# Patient Record
Sex: MALE | NOT HISPANIC OR LATINO | Employment: OTHER | URBAN - METROPOLITAN AREA
[De-identification: names, ages, dates, MRNs, and addresses within clinical notes are randomized per-mention and may not be internally consistent; named-entity substitution may affect disease eponyms.]

---

## 2022-12-17 PROBLEM — Z85.828 HISTORY OF BASAL CELL CARCINOMA: Status: ACTIVE | Noted: 2022-12-17

## 2022-12-17 PROBLEM — Z86.018 HISTORY OF DYSPLASTIC NEVUS: Status: ACTIVE | Noted: 2022-12-17

## 2022-12-17 PROBLEM — Z12.11 SCREENING FOR COLON CANCER: Status: ACTIVE | Noted: 2022-12-17

## 2022-12-17 PROBLEM — E78.2 MIXED HYPERLIPIDEMIA: Status: ACTIVE | Noted: 2022-12-17

## 2022-12-17 PROBLEM — I10 ESSENTIAL HYPERTENSION: Status: ACTIVE | Noted: 2022-12-17

## 2022-12-17 PROBLEM — H91.90 HARD OF HEARING: Status: ACTIVE | Noted: 2022-12-17

## 2022-12-17 PROBLEM — J30.2 SEASONAL ALLERGIC RHINITIS: Status: ACTIVE | Noted: 2022-12-17

## 2022-12-17 PROBLEM — M19.90 OSTEOARTHRITIS: Status: ACTIVE | Noted: 2022-12-17

## 2022-12-17 PROBLEM — D12.6 ADENOMATOUS POLYP OF COLON: Status: ACTIVE | Noted: 2022-12-17

## 2022-12-17 PROBLEM — I25.10 CORONARY ATHEROSCLEROSIS OF NATIVE CORONARY ARTERY: Status: ACTIVE | Noted: 2022-12-17

## 2022-12-17 PROBLEM — K62.5 RECTAL BLEEDING: Status: ACTIVE | Noted: 2022-12-17

## 2022-12-17 PROBLEM — H26.9 CATARACT: Status: ACTIVE | Noted: 2022-12-17

## 2022-12-17 PROBLEM — G43.009 MIGRAINE WITHOUT AURA AND WITHOUT STATUS MIGRAINOSUS, NOT INTRACTABLE: Status: ACTIVE | Noted: 2022-12-17

## 2022-12-17 PROBLEM — C61 MALIGNANT NEOPLASM OF PROSTATE (H): Status: ACTIVE | Noted: 2022-12-17

## 2022-12-17 PROBLEM — N20.0 CALCULUS OF KIDNEY: Status: ACTIVE | Noted: 2022-12-17

## 2022-12-19 ENCOUNTER — ANESTHESIA EVENT (OUTPATIENT)
Dept: SURGERY | Facility: CLINIC | Age: 82
DRG: 468 | End: 2022-12-19
Payer: COMMERCIAL

## 2022-12-19 RX ORDER — FEXOFENADINE HCL 180 MG/1
180 TABLET ORAL EVERY MORNING
COMMUNITY

## 2022-12-19 RX ORDER — LOSARTAN POTASSIUM 100 MG/1
100 TABLET ORAL EVERY EVENING
COMMUNITY

## 2022-12-19 ASSESSMENT — LIFESTYLE VARIABLES: TOBACCO_USE: 0

## 2022-12-19 NOTE — PROGRESS NOTES
PTA medications updated by Medication Scribe prior to surgery via phone call with patient (last doses completed by Nurse)     Medication history sources: Patient and H&P  In the past week, patient estimated taking medication this percent of the time: Greater than 90%  Adherence assessment: N/A Not Observed    Significant changes made to the medication list:  None      Additional medication history information:   None    Medication reconciliation completed by provider prior to medication history? No    Time spent in this activity: 35 minutes    The information provided in this note is only as accurate as the sources available at the time of update(s)      Prior to Admission medications    Medication Sig Last Dose Taking? Auth Provider Long Term End Date   ASPIRIN PO Take 81 mg by mouth daily 12/10/2022 at PM Yes Reported, Patient     Atorvastatin Calcium (LIPITOR PO) Take 20 mg by mouth every evening 12/19/2022 at PM Yes Reported, Patient Yes    calcium carbonate (OS- MG [place]. [place]) 500 MG tablet Take 500 mg by mouth daily 12/19/2022 at AM Yes Reported, Patient     diphenhydrAMINE-acetaminophen (TYLENOL PM)  MG tablet Take 1 tablet by mouth nightly as needed for sleep  at HS Yes Reported, Patient     fexofenadine (ALLEGRA) 180 MG tablet Take 180 mg by mouth every morning 12/19/2022 at AM Yes Reported, Patient     losartan (COZAAR) 100 MG tablet Take 100 mg by mouth daily 12/19/2022 at PM Yes Reported, Patient Yes    Omega-3 Fatty Acids (OMEGA-3 FISH OIL) 1200 MG CAPS Take 3 capsules by mouth daily 12/19/2022 at AM Yes Reported, Patient

## 2022-12-20 ENCOUNTER — APPOINTMENT (OUTPATIENT)
Dept: GENERAL RADIOLOGY | Facility: CLINIC | Age: 82
DRG: 468 | End: 2022-12-20
Attending: PHYSICIAN ASSISTANT
Payer: COMMERCIAL

## 2022-12-20 ENCOUNTER — ANESTHESIA (OUTPATIENT)
Dept: SURGERY | Facility: CLINIC | Age: 82
DRG: 468 | End: 2022-12-20
Payer: COMMERCIAL

## 2022-12-20 ENCOUNTER — HOSPITAL ENCOUNTER (INPATIENT)
Facility: CLINIC | Age: 82
LOS: 1 days | Discharge: HOME | DRG: 468 | End: 2022-12-21
Attending: ORTHOPAEDIC SURGERY | Admitting: ORTHOPAEDIC SURGERY
Payer: COMMERCIAL

## 2022-12-20 DIAGNOSIS — Z98.890 POST-OPERATIVE STATE: Primary | ICD-10-CM

## 2022-12-20 PROBLEM — Z96.649 STATUS POST REVISION OF TOTAL HIP REPLACEMENT: Status: ACTIVE | Noted: 2022-12-20

## 2022-12-20 LAB
ABO/RH(D): NORMAL
ANTIBODY SCREEN: NEGATIVE
APPEARANCE FLD: ABNORMAL
CELL COUNT BODY FLUID SOURCE: ABNORMAL
COLOR FLD: ABNORMAL
CREAT SERPL-MCNC: 1.1 MG/DL (ref 0.66–1.25)
GFR SERPL CREATININE-BSD FRML MDRD: 68 ML/MIN/1.73M2
GLUCOSE BLD-MCNC: 108 MG/DL (ref 70–99)
PATH REV: ABNORMAL
POTASSIUM BLD-SCNC: 3.7 MMOL/L (ref 3.4–5.3)
SPECIMEN EXPIRATION DATE: NORMAL

## 2022-12-20 PROCEDURE — 0SUE09Z SUPPLEMENT LEFT HIP JOINT, ACETABULAR SURFACE WITH LINER, OPEN APPROACH: ICD-10-PCS | Performed by: ORTHOPAEDIC SURGERY

## 2022-12-20 PROCEDURE — 0SPS0JZ REMOVAL OF SYNTHETIC SUBSTITUTE FROM LEFT HIP JOINT, FEMORAL SURFACE, OPEN APPROACH: ICD-10-PCS | Performed by: ORTHOPAEDIC SURGERY

## 2022-12-20 PROCEDURE — 250N000011 HC RX IP 250 OP 636

## 2022-12-20 PROCEDURE — C1776 JOINT DEVICE (IMPLANTABLE): HCPCS | Performed by: ORTHOPAEDIC SURGERY

## 2022-12-20 PROCEDURE — 250N000013 HC RX MED GY IP 250 OP 250 PS 637: Performed by: PHYSICIAN ASSISTANT

## 2022-12-20 PROCEDURE — 258N000003 HC RX IP 258 OP 636: Performed by: ANESTHESIOLOGY

## 2022-12-20 PROCEDURE — 250N000011 HC RX IP 250 OP 636: Performed by: ORTHOPAEDIC SURGERY

## 2022-12-20 PROCEDURE — 999N000065 XR PELVIS AND HIP PORTABLE LEFT 1 VIEW

## 2022-12-20 PROCEDURE — 87070 CULTURE OTHR SPECIMN AEROBIC: CPT | Performed by: ORTHOPAEDIC SURGERY

## 2022-12-20 PROCEDURE — 87075 CULTR BACTERIA EXCEPT BLOOD: CPT | Performed by: ORTHOPAEDIC SURGERY

## 2022-12-20 PROCEDURE — 250N000011 HC RX IP 250 OP 636: Performed by: ANESTHESIOLOGY

## 2022-12-20 PROCEDURE — 86850 RBC ANTIBODY SCREEN: CPT | Performed by: ANESTHESIOLOGY

## 2022-12-20 PROCEDURE — C1713 ANCHOR/SCREW BN/BN,TIS/BN: HCPCS | Performed by: ORTHOPAEDIC SURGERY

## 2022-12-20 PROCEDURE — 86901 BLOOD TYPING SEROLOGIC RH(D): CPT | Performed by: ANESTHESIOLOGY

## 2022-12-20 PROCEDURE — 36415 COLL VENOUS BLD VENIPUNCTURE: CPT | Performed by: ANESTHESIOLOGY

## 2022-12-20 PROCEDURE — 120N000001 HC R&B MED SURG/OB

## 2022-12-20 PROCEDURE — 250N000009 HC RX 250

## 2022-12-20 PROCEDURE — 87206 SMEAR FLUORESCENT/ACID STAI: CPT | Performed by: ORTHOPAEDIC SURGERY

## 2022-12-20 PROCEDURE — 250N000011 HC RX IP 250 OP 636: Performed by: PHYSICIAN ASSISTANT

## 2022-12-20 PROCEDURE — 87116 MYCOBACTERIA CULTURE: CPT | Performed by: ORTHOPAEDIC SURGERY

## 2022-12-20 PROCEDURE — 0SRS03A REPLACEMENT OF LEFT HIP JOINT, FEMORAL SURFACE WITH CERAMIC SYNTHETIC SUBSTITUTE, UNCEMENTED, OPEN APPROACH: ICD-10-PCS | Performed by: ORTHOPAEDIC SURGERY

## 2022-12-20 PROCEDURE — 258N000001 HC RX 258: Performed by: ORTHOPAEDIC SURGERY

## 2022-12-20 PROCEDURE — 710N000009 HC RECOVERY PHASE 1, LEVEL 1, PER MIN: Performed by: ORTHOPAEDIC SURGERY

## 2022-12-20 PROCEDURE — 82565 ASSAY OF CREATININE: CPT | Performed by: ORTHOPAEDIC SURGERY

## 2022-12-20 PROCEDURE — 272N000001 HC OR GENERAL SUPPLY STERILE: Performed by: ORTHOPAEDIC SURGERY

## 2022-12-20 PROCEDURE — 360N000078 HC SURGERY LEVEL 5, PER MIN: Performed by: ORTHOPAEDIC SURGERY

## 2022-12-20 PROCEDURE — 84132 ASSAY OF SERUM POTASSIUM: CPT | Performed by: ANESTHESIOLOGY

## 2022-12-20 PROCEDURE — 370N000017 HC ANESTHESIA TECHNICAL FEE, PER MIN: Performed by: ORTHOPAEDIC SURGERY

## 2022-12-20 PROCEDURE — 82947 ASSAY GLUCOSE BLOOD QUANT: CPT | Performed by: ANESTHESIOLOGY

## 2022-12-20 PROCEDURE — 89051 BODY FLUID CELL COUNT: CPT | Performed by: ORTHOPAEDIC SURGERY

## 2022-12-20 PROCEDURE — 250N000009 HC RX 250: Performed by: ORTHOPAEDIC SURGERY

## 2022-12-20 PROCEDURE — 258N000003 HC RX IP 258 OP 636: Performed by: PHYSICIAN ASSISTANT

## 2022-12-20 PROCEDURE — 0S9B0ZX DRAINAGE OF LEFT HIP JOINT, OPEN APPROACH, DIAGNOSTIC: ICD-10-PCS | Performed by: ORTHOPAEDIC SURGERY

## 2022-12-20 PROCEDURE — 999N000141 HC STATISTIC PRE-PROCEDURE NURSING ASSESSMENT: Performed by: ORTHOPAEDIC SURGERY

## 2022-12-20 PROCEDURE — 99207 PR NO CHARGE LOS: CPT | Performed by: PHYSICIAN ASSISTANT

## 2022-12-20 PROCEDURE — 0SPB09Z REMOVAL OF LINER FROM LEFT HIP JOINT, OPEN APPROACH: ICD-10-PCS | Performed by: ORTHOPAEDIC SURGERY

## 2022-12-20 DEVICE — R3 20 DEGREE XLPE ACETABULAR LINER                                    40MM INNER DIAMETER X 56MM OUTER DIAMETER
Type: IMPLANTABLE DEVICE | Site: HIP | Status: FUNCTIONAL
Brand: R3

## 2022-12-20 DEVICE — BIOLOX DELTA HEAD 40MM 12/14 SHORT+0
Type: IMPLANTABLE DEVICE | Site: HIP | Status: FUNCTIONAL
Brand: BIOLOX DELTA

## 2022-12-20 RX ORDER — CEFAZOLIN SODIUM/WATER 2 G/20 ML
2 SYRINGE (ML) INTRAVENOUS SEE ADMIN INSTRUCTIONS
Status: DISCONTINUED | OUTPATIENT
Start: 2022-12-20 | End: 2022-12-20 | Stop reason: HOSPADM

## 2022-12-20 RX ORDER — DIPHENHYDRAMINE HCL 12.5MG/5ML
12.5 LIQUID (ML) ORAL EVERY 6 HOURS PRN
Status: DISCONTINUED | OUTPATIENT
Start: 2022-12-20 | End: 2022-12-21 | Stop reason: HOSPADM

## 2022-12-20 RX ORDER — ONDANSETRON 2 MG/ML
INJECTION INTRAMUSCULAR; INTRAVENOUS PRN
Status: DISCONTINUED | OUTPATIENT
Start: 2022-12-20 | End: 2022-12-20

## 2022-12-20 RX ORDER — ONDANSETRON 4 MG/1
4 TABLET, ORALLY DISINTEGRATING ORAL EVERY 30 MIN PRN
Status: DISCONTINUED | OUTPATIENT
Start: 2022-12-20 | End: 2022-12-20 | Stop reason: HOSPADM

## 2022-12-20 RX ORDER — OXYCODONE HYDROCHLORIDE 5 MG/1
10 TABLET ORAL EVERY 4 HOURS PRN
Status: DISCONTINUED | OUTPATIENT
Start: 2022-12-20 | End: 2022-12-21 | Stop reason: HOSPADM

## 2022-12-20 RX ORDER — TRANEXAMIC ACID 650 MG/1
1950 TABLET ORAL ONCE
Status: COMPLETED | OUTPATIENT
Start: 2022-12-20 | End: 2022-12-20

## 2022-12-20 RX ORDER — SODIUM CHLORIDE, SODIUM LACTATE, POTASSIUM CHLORIDE, CALCIUM CHLORIDE 600; 310; 30; 20 MG/100ML; MG/100ML; MG/100ML; MG/100ML
INJECTION, SOLUTION INTRAVENOUS CONTINUOUS
Status: DISCONTINUED | OUTPATIENT
Start: 2022-12-20 | End: 2022-12-20 | Stop reason: HOSPADM

## 2022-12-20 RX ORDER — ACETAMINOPHEN 325 MG/1
650 TABLET ORAL EVERY 4 HOURS PRN
Qty: 100 TABLET | Refills: 0 | Status: SHIPPED | OUTPATIENT
Start: 2022-12-20 | End: 2022-12-21

## 2022-12-20 RX ORDER — POLYETHYLENE GLYCOL 3350 17 G/17G
17 POWDER, FOR SOLUTION ORAL DAILY
Status: DISCONTINUED | OUTPATIENT
Start: 2022-12-21 | End: 2022-12-21 | Stop reason: HOSPADM

## 2022-12-20 RX ORDER — LIDOCAINE 40 MG/G
CREAM TOPICAL
Status: DISCONTINUED | OUTPATIENT
Start: 2022-12-20 | End: 2022-12-21 | Stop reason: HOSPADM

## 2022-12-20 RX ORDER — PROPOFOL 10 MG/ML
INJECTION, EMULSION INTRAVENOUS PRN
Status: DISCONTINUED | OUTPATIENT
Start: 2022-12-20 | End: 2022-12-20

## 2022-12-20 RX ORDER — NALOXONE HYDROCHLORIDE 0.4 MG/ML
0.4 INJECTION, SOLUTION INTRAMUSCULAR; INTRAVENOUS; SUBCUTANEOUS
Status: DISCONTINUED | OUTPATIENT
Start: 2022-12-20 | End: 2022-12-21 | Stop reason: HOSPADM

## 2022-12-20 RX ORDER — HYDROMORPHONE HCL IN WATER/PF 6 MG/30 ML
0.2 PATIENT CONTROLLED ANALGESIA SYRINGE INTRAVENOUS
Status: DISCONTINUED | OUTPATIENT
Start: 2022-12-20 | End: 2022-12-21 | Stop reason: HOSPADM

## 2022-12-20 RX ORDER — HYDROMORPHONE HCL IN WATER/PF 6 MG/30 ML
0.4 PATIENT CONTROLLED ANALGESIA SYRINGE INTRAVENOUS
Status: DISCONTINUED | OUTPATIENT
Start: 2022-12-20 | End: 2022-12-21 | Stop reason: HOSPADM

## 2022-12-20 RX ORDER — NALOXONE HYDROCHLORIDE 0.4 MG/ML
0.2 INJECTION, SOLUTION INTRAMUSCULAR; INTRAVENOUS; SUBCUTANEOUS
Status: DISCONTINUED | OUTPATIENT
Start: 2022-12-20 | End: 2022-12-21 | Stop reason: HOSPADM

## 2022-12-20 RX ORDER — OXYCODONE HYDROCHLORIDE 5 MG/1
5 TABLET ORAL EVERY 4 HOURS PRN
Status: DISCONTINUED | OUTPATIENT
Start: 2022-12-20 | End: 2022-12-21 | Stop reason: HOSPADM

## 2022-12-20 RX ORDER — PROCHLORPERAZINE MALEATE 5 MG
5 TABLET ORAL EVERY 6 HOURS PRN
Status: DISCONTINUED | OUTPATIENT
Start: 2022-12-20 | End: 2022-12-21 | Stop reason: HOSPADM

## 2022-12-20 RX ORDER — LIDOCAINE HYDROCHLORIDE 20 MG/ML
INJECTION, SOLUTION INFILTRATION; PERINEURAL PRN
Status: DISCONTINUED | OUTPATIENT
Start: 2022-12-20 | End: 2022-12-20

## 2022-12-20 RX ORDER — BISACODYL 10 MG
10 SUPPOSITORY, RECTAL RECTAL DAILY PRN
Status: DISCONTINUED | OUTPATIENT
Start: 2022-12-20 | End: 2022-12-21 | Stop reason: HOSPADM

## 2022-12-20 RX ORDER — HYDROMORPHONE HCL IN WATER/PF 6 MG/30 ML
0.2 PATIENT CONTROLLED ANALGESIA SYRINGE INTRAVENOUS EVERY 5 MIN PRN
Status: DISCONTINUED | OUTPATIENT
Start: 2022-12-20 | End: 2022-12-20 | Stop reason: HOSPADM

## 2022-12-20 RX ORDER — HYDROMORPHONE HCL IN WATER/PF 6 MG/30 ML
0.4 PATIENT CONTROLLED ANALGESIA SYRINGE INTRAVENOUS EVERY 5 MIN PRN
Status: DISCONTINUED | OUTPATIENT
Start: 2022-12-20 | End: 2022-12-20 | Stop reason: HOSPADM

## 2022-12-20 RX ORDER — CELECOXIB 200 MG/1
400 CAPSULE ORAL ONCE
Status: COMPLETED | OUTPATIENT
Start: 2022-12-20 | End: 2022-12-20

## 2022-12-20 RX ORDER — FENTANYL CITRATE 0.05 MG/ML
50 INJECTION, SOLUTION INTRAMUSCULAR; INTRAVENOUS EVERY 5 MIN PRN
Status: DISCONTINUED | OUTPATIENT
Start: 2022-12-20 | End: 2022-12-20 | Stop reason: HOSPADM

## 2022-12-20 RX ORDER — ONDANSETRON 4 MG/1
4 TABLET, ORALLY DISINTEGRATING ORAL EVERY 6 HOURS PRN
Status: DISCONTINUED | OUTPATIENT
Start: 2022-12-20 | End: 2022-12-21 | Stop reason: HOSPADM

## 2022-12-20 RX ORDER — ACETAMINOPHEN 325 MG/1
975 TABLET ORAL EVERY 8 HOURS
Status: DISCONTINUED | OUTPATIENT
Start: 2022-12-20 | End: 2022-12-21 | Stop reason: HOSPADM

## 2022-12-20 RX ORDER — ASPIRIN 81 MG/1
162 TABLET ORAL DAILY
Status: DISCONTINUED | OUTPATIENT
Start: 2022-12-20 | End: 2022-12-21 | Stop reason: HOSPADM

## 2022-12-20 RX ORDER — CELECOXIB 200 MG/1
200 CAPSULE ORAL DAILY
Qty: 30 CAPSULE | Refills: 0 | Status: SHIPPED | OUTPATIENT
Start: 2022-12-20 | End: 2022-12-21

## 2022-12-20 RX ORDER — FEXOFENADINE HCL 180 MG/1
180 TABLET ORAL EVERY MORNING
Status: DISCONTINUED | OUTPATIENT
Start: 2022-12-21 | End: 2022-12-21 | Stop reason: HOSPADM

## 2022-12-20 RX ORDER — AMOXICILLIN 250 MG
1 CAPSULE ORAL 2 TIMES DAILY
Status: DISCONTINUED | OUTPATIENT
Start: 2022-12-20 | End: 2022-12-21 | Stop reason: HOSPADM

## 2022-12-20 RX ORDER — CEFAZOLIN SODIUM/WATER 2 G/20 ML
2 SYRINGE (ML) INTRAVENOUS
Status: COMPLETED | OUTPATIENT
Start: 2022-12-20 | End: 2022-12-20

## 2022-12-20 RX ORDER — CELECOXIB 100 MG/1
100 CAPSULE ORAL 2 TIMES DAILY
Status: DISCONTINUED | OUTPATIENT
Start: 2022-12-21 | End: 2022-12-21 | Stop reason: HOSPADM

## 2022-12-20 RX ORDER — ONDANSETRON 2 MG/ML
4 INJECTION INTRAMUSCULAR; INTRAVENOUS EVERY 6 HOURS PRN
Status: DISCONTINUED | OUTPATIENT
Start: 2022-12-20 | End: 2022-12-21 | Stop reason: HOSPADM

## 2022-12-20 RX ORDER — SODIUM CHLORIDE, SODIUM LACTATE, POTASSIUM CHLORIDE, CALCIUM CHLORIDE 600; 310; 30; 20 MG/100ML; MG/100ML; MG/100ML; MG/100ML
INJECTION, SOLUTION INTRAVENOUS CONTINUOUS
Status: DISCONTINUED | OUTPATIENT
Start: 2022-12-20 | End: 2022-12-21 | Stop reason: HOSPADM

## 2022-12-20 RX ORDER — DEXAMETHASONE SODIUM PHOSPHATE 4 MG/ML
INJECTION, SOLUTION INTRA-ARTICULAR; INTRALESIONAL; INTRAMUSCULAR; INTRAVENOUS; SOFT TISSUE PRN
Status: DISCONTINUED | OUTPATIENT
Start: 2022-12-20 | End: 2022-12-20

## 2022-12-20 RX ORDER — CEFAZOLIN SODIUM 2 G/100ML
2 INJECTION, SOLUTION INTRAVENOUS EVERY 8 HOURS
Status: COMPLETED | OUTPATIENT
Start: 2022-12-20 | End: 2022-12-21

## 2022-12-20 RX ORDER — HYDROXYZINE HYDROCHLORIDE 10 MG/1
10 TABLET, FILM COATED ORAL EVERY 6 HOURS PRN
Status: DISCONTINUED | OUTPATIENT
Start: 2022-12-20 | End: 2022-12-21 | Stop reason: HOSPADM

## 2022-12-20 RX ORDER — ONDANSETRON 2 MG/ML
4 INJECTION INTRAMUSCULAR; INTRAVENOUS EVERY 30 MIN PRN
Status: DISCONTINUED | OUTPATIENT
Start: 2022-12-20 | End: 2022-12-20 | Stop reason: HOSPADM

## 2022-12-20 RX ORDER — ATORVASTATIN CALCIUM 20 MG/1
20 TABLET, FILM COATED ORAL EVERY EVENING
Status: DISCONTINUED | OUTPATIENT
Start: 2022-12-20 | End: 2022-12-21 | Stop reason: HOSPADM

## 2022-12-20 RX ORDER — VANCOMYCIN HYDROCHLORIDE 1 G/20ML
INJECTION, POWDER, LYOPHILIZED, FOR SOLUTION INTRAVENOUS PRN
Status: DISCONTINUED | OUTPATIENT
Start: 2022-12-20 | End: 2022-12-20 | Stop reason: HOSPADM

## 2022-12-20 RX ORDER — PROPOFOL 10 MG/ML
INJECTION, EMULSION INTRAVENOUS CONTINUOUS PRN
Status: DISCONTINUED | OUTPATIENT
Start: 2022-12-20 | End: 2022-12-20

## 2022-12-20 RX ORDER — OXYCODONE HYDROCHLORIDE 5 MG/1
5 TABLET ORAL EVERY 4 HOURS PRN
Qty: 26 TABLET | Refills: 0 | Status: SHIPPED | OUTPATIENT
Start: 2022-12-20 | End: 2022-12-21

## 2022-12-20 RX ORDER — AMOXICILLIN 250 MG
1-2 CAPSULE ORAL 2 TIMES DAILY
Qty: 100 TABLET | Refills: 0 | Status: SHIPPED | OUTPATIENT
Start: 2022-12-20 | End: 2022-12-21

## 2022-12-20 RX ORDER — ASPIRIN 81 MG/1
162 TABLET ORAL 2 TIMES DAILY
Qty: 120 TABLET | Refills: 0 | Status: SHIPPED | OUTPATIENT
Start: 2022-12-20 | End: 2022-12-21

## 2022-12-20 RX ORDER — LOSARTAN POTASSIUM 100 MG/1
100 TABLET ORAL EVERY EVENING
Status: DISCONTINUED | OUTPATIENT
Start: 2022-12-20 | End: 2022-12-21 | Stop reason: HOSPADM

## 2022-12-20 RX ORDER — ACETAMINOPHEN 325 MG/1
975 TABLET ORAL ONCE
Status: COMPLETED | OUTPATIENT
Start: 2022-12-20 | End: 2022-12-20

## 2022-12-20 RX ORDER — FENTANYL CITRATE 0.05 MG/ML
25 INJECTION, SOLUTION INTRAMUSCULAR; INTRAVENOUS EVERY 5 MIN PRN
Status: DISCONTINUED | OUTPATIENT
Start: 2022-12-20 | End: 2022-12-20 | Stop reason: HOSPADM

## 2022-12-20 RX ADMIN — SODIUM CHLORIDE, POTASSIUM CHLORIDE, SODIUM LACTATE AND CALCIUM CHLORIDE: 600; 310; 30; 20 INJECTION, SOLUTION INTRAVENOUS at 14:56

## 2022-12-20 RX ADMIN — SODIUM CHLORIDE, POTASSIUM CHLORIDE, SODIUM LACTATE AND CALCIUM CHLORIDE: 600; 310; 30; 20 INJECTION, SOLUTION INTRAVENOUS at 12:42

## 2022-12-20 RX ADMIN — PHENYLEPHRINE HYDROCHLORIDE 100 MCG: 10 INJECTION INTRAVENOUS at 13:28

## 2022-12-20 RX ADMIN — PROPOFOL 100 MCG/KG/MIN: 10 INJECTION, EMULSION INTRAVENOUS at 12:54

## 2022-12-20 RX ADMIN — ONDANSETRON 4 MG: 2 INJECTION INTRAMUSCULAR; INTRAVENOUS at 12:45

## 2022-12-20 RX ADMIN — TRANEXAMIC ACID 1950 MG: 650 TABLET ORAL at 12:06

## 2022-12-20 RX ADMIN — CEFAZOLIN SODIUM 2 G: 2 INJECTION, SOLUTION INTRAVENOUS at 20:43

## 2022-12-20 RX ADMIN — PHENYLEPHRINE HYDROCHLORIDE 50 MCG: 10 INJECTION INTRAVENOUS at 13:58

## 2022-12-20 RX ADMIN — Medication 2 G: at 12:45

## 2022-12-20 RX ADMIN — ASPIRIN 162 MG: 81 TABLET, COATED ORAL at 18:19

## 2022-12-20 RX ADMIN — PHENYLEPHRINE HYDROCHLORIDE 50 MCG: 10 INJECTION INTRAVENOUS at 13:43

## 2022-12-20 RX ADMIN — ACETAMINOPHEN 975 MG: 325 TABLET, FILM COATED ORAL at 18:19

## 2022-12-20 RX ADMIN — MEPIVACAINE HYDROCHLORIDE 3 ML: 20 INJECTION, SOLUTION EPIDURAL; INFILTRATION at 12:51

## 2022-12-20 RX ADMIN — LOSARTAN POTASSIUM 100 MG: 100 TABLET, FILM COATED ORAL at 20:43

## 2022-12-20 RX ADMIN — DEXAMETHASONE SODIUM PHOSPHATE 10 MG: 4 INJECTION, SOLUTION INTRA-ARTICULAR; INTRALESIONAL; INTRAMUSCULAR; INTRAVENOUS; SOFT TISSUE at 12:45

## 2022-12-20 RX ADMIN — SENNOSIDES AND DOCUSATE SODIUM 1 TABLET: 50; 8.6 TABLET ORAL at 20:43

## 2022-12-20 RX ADMIN — PROPOFOL 50 MG: 10 INJECTION, EMULSION INTRAVENOUS at 12:54

## 2022-12-20 RX ADMIN — ACETAMINOPHEN 975 MG: 325 TABLET, FILM COATED ORAL at 12:05

## 2022-12-20 RX ADMIN — CELECOXIB 400 MG: 200 CAPSULE ORAL at 12:06

## 2022-12-20 RX ADMIN — LIDOCAINE HYDROCHLORIDE 100 MG: 20 INJECTION, SOLUTION INFILTRATION; PERINEURAL at 12:54

## 2022-12-20 RX ADMIN — ATORVASTATIN CALCIUM 20 MG: 20 TABLET, FILM COATED ORAL at 20:43

## 2022-12-20 ASSESSMENT — ACTIVITIES OF DAILY LIVING (ADL)
ADLS_ACUITY_SCORE: 37
ADLS_ACUITY_SCORE: 20
DIFFICULTY_EATING/SWALLOWING: NO
DRESSING/BATHING_DIFFICULTY: NO
CHANGE_IN_FUNCTIONAL_STATUS_SINCE_ONSET_OF_CURRENT_ILLNESS/INJURY: NO
DOING_ERRANDS_INDEPENDENTLY_DIFFICULTY: NO
CONCENTRATING,_REMEMBERING_OR_MAKING_DECISIONS_DIFFICULTY: NO
WEAR_GLASSES_OR_BLIND: NO
ADLS_ACUITY_SCORE: 22
TOILETING_ISSUES: NO
ADLS_ACUITY_SCORE: 37
FALL_HISTORY_WITHIN_LAST_SIX_MONTHS: NO
ADLS_ACUITY_SCORE: 37
WALKING_OR_CLIMBING_STAIRS_DIFFICULTY: NO
ADLS_ACUITY_SCORE: 22

## 2022-12-20 NOTE — PROGRESS NOTES
Hospitalist consult note, chart check:    Marco Hendricks is an 82-year-old male with a past medical history of CAD, hypertension, hyperlipidemia, prostate cancer, kidney stone, and rectal bleeding who underwent a scheduled review of hip replacement on 12/20/2022.  Procedure was performed by Dr. Crump.  Procedure performed under spinal anesthesia.  No complications reported.  EBL 50 cc.  The hospitalist service was consulted for routine postoperative medical comanagement of chronic medical conditions.    Postoperatively, patient's vital signs are stable with some mild to moderate hypertension.  /98, 173/106, 147/91.  Pulse 97, temperature 97.6, RR 18, O2 saturation 94% on RA.  Lab work this morning shows creatinine 1.1, potassium 3.7, glucose 108.  On his preop visit, his hemoglobin is 14.2, potassium 4.8, creatinine 1.04.  Preop evaluation has been reviewed.  Patient's chronic medical conditions appear to be treated and stable.    S/p redo left MARIJA 12/20/2022  --Defer routine postoperative cares including pain management, DVT prophylaxis, PT/OT to primary orthopedic surgery service.    Cardiac: CAD, hypertension, hyperlipidemia:   --Resume PTA losartan 100 mg daily with hold parameter, atorvastatin 20 mg every afternoon.  Resume aspirin at orthopedic surgery discretion.    --Hold nonessential medications  -- We will check hemoglobin and BMP in a.m., hospitalist will chart check for blood pressures and lab results.    Formal consultation will be deferred at this time.  Please contact us with acute medical questions or concerns.    Vijay Pascal PA-C  [facility] Hospitalist [facility]

## 2022-12-20 NOTE — OR NURSING
07/23/18        [address] [place] [address] Professor Tito [address]      Dear Tim Naylor records indicate that you have outstanding lab work and or testing that was ordered for you and has not yet been completed:          Hemoglobin A1C [
OK by BETSEY Slater to tranfer to the floor. Bilat hearing aids returned to ears. Two belonging bags returned to pt.   
Other

## 2022-12-20 NOTE — OP NOTE
Procedure Date: 12/20/2022    SURGEON:  Francisco Crump MD    FIRST ASSISTANT:  Leopoldo Huston PA-C.  Please bill for Mr. Huston as the first assist as he did help with patient transfer, positioning, prepping, draping depth exposure, wound closure, application of dressing and patient transfer.    OPA:  Mr. Chadwick Kee.    PREOPERATIVE DIAGNOSES:    1.  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  2.  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    PROCEDURE PERFORMED:    1.  Revision of left total hip arthroplasty, head and liner exchange.  2.  Polyethylene liner for 40 mm head with a 10-degree high wall and a 40 mm Biolox head.    INDICATIONS FOR SURGERY:  Mr. Hendricks is an 82-year-old male who is still extremely active and healthy.  He underwent a total hip by myself in 2010 with the above articulations.  Given what we know with metal-on-metal hips.  We have been following Mr. Hendricks take on an annual basis, but did notice in the last year that his ions did drop significantly.  In addition, a MARs MRI was performed, which also showed increased signal uptake around.  Clinically, he had no pain, no fevers, no chills.  Preoperative labs did not indicate any infection.  We discussed treatment options and felt that he would benefit from a head and liner exchange and possible bone grafting of the greater trochanter if there was enough degeneration.  Informed consent was obtained.    ANESTHETIC:  Spinal block with local infiltration of joint cocktail.    FINDINGS:  The fluid within the color of cough of coffee with cream.  He did not have any particulate in it.  The musculature was still and intact.  The previous anterolateral approach repair was still intact  as well.  The femoral stem as well as the acetabular component were still well fixed.    DESCRIPTION OF PROCEDURE:  Mr. Hendricks was correctly identified by myself in the PAR.  His left lower extremity was marked.  He was then taken to the operating room, was placed under spinal anesthetic.  He was then placed in the right lateral decubitus position with the Yap hip merlos.  He was then prepped with have a guide followed by a 10-minute Betadine scrub, alcohol paint, ChloraPrep and then draped in the usual fashion.  A timeout was performed.  Ancef had been administered as well.  An incision was made directly over the greater trochanter.  The previous incision was used and lengthened slightly. The soft tissue was cleared from the IT band.  The IT band was then cleared split longitudinally with its fibers.  The Omni-Tract retractor was placed.  I then took a 10 mL syringe and withdrew a sample of the fluid from the hip joint with the above-named coloring.  This was sent for a cell count with diff as well as cultures.  I then did a standard posterior approach to the hip, taking down the short external rotators and capsule in 1 mass.  We then were able to dislocate the hip using a drift.  The femoral head was removed.  I took a curette and went around the edges of the stem, which was still well fixed and felt that we did not need to do anything with this. I then did a partial capsulectomy to remove all of the metal stained tissue that I could find we were able then to go with a cobra retractor over the front of the hip to retract the trunnion out of the way and had very good exposure of our acetabulum.  The edges of the acetabulum were then cleared of all soft tissue.  The notch that was in the top of the acetabulum was readily identified and then I was able to use the instrument for removing the metal liner without difficulty.  The liner was removed and we thoroughly irrigated the acetabulum.  The acetabulum again  appeared to be well fixed and there was no damage to it.  We then placed a trial acetabular component with a trial head, the length was good.  He would start dislocating at about approximately 45 degrees of internal rotation.  Therefore, we decided to go ahead with a high wall liner.  The trial components were removed.  The high wall liner was impacted into place.  The trunnion had been thoroughly cleaned of all metallosis and was dried.  It appeared to be still in good position.  Therefore, we went with a +0, 40 mm Biolox head, which was impacted in place and then relocated.  At this point, he had nearly 70 degrees of internal rotation without dislocation.  The entire wound was then soaked with a dilute warm Betadine solution for 5 minutes.  The short external rotators and posterior caps were repaired back to the posterior femur with #5 FiberWires through bone tunnels.  The vancomycin was then placed deep within the joint.  The IT band was then repaired with a running #2 Quill, the deep fascia closed with 0 Stratafix tubercle with 2-0 Stratafix.  Skin closed with 3-0 Quill and a Prineo dressing was applied as well.    COMPLICATIONS:  None.    SPECIMENS:  Tissue from the wound as well as fluid.    COUNTS:  Sponge counts correct.    BLOOD LOSS:  50 mL    DISPOSITION:  The patient was taken to the PAR in stable condition.    Francisco Crump MD        D: 2022   T: 2022   MT: ELLIE    Name:     CARIDAD WOO  MRN:      [id number]        Account:        [id number]   :      1940           Procedure Date: 2022     Document: [id number]

## 2022-12-20 NOTE — ANESTHESIA CARE TRANSFER NOTE
Patient: Marco Hendricks    Procedure: Procedure(s):  LEFT OPEN TOTAL HIP REVISION ; HEAD AND LINER EXCHANGE       Diagnosis: Other mechanical complication of internal left hip prosthesis, initial encounter (H) [T84.481A]  Diagnosis Additional Information: No value filed.    Anesthesia Type:   Spinal     Note:    Oropharynx: oropharynx clear of all foreign objects and spontaneously breathing  Level of Consciousness: drowsy  Oxygen Supplementation: face mask  Level of Supplemental Oxygen (L/min / FiO2): 8  Independent Airway: airway patency satisfactory and stable  Dentition: dentition unchanged  Vital Signs Stable: post-procedure vital signs reviewed and stable  Report to RN Given: handoff report given  Patient transferred to: PACU  Comments: Pt exhibits spontaneous respirations, all monitors and alarms on in PACU, VSS, patent IV, report and transfer of care to RN.    Handoff Report: Identifed the Patient, Identified the Reponsible Provider, Reviewed the pertinent medical history, Discussed the surgical course, Reviewed Intra-OP anesthesia mangement and issues during anesthesia, Set expectations for post-procedure period and Allowed opportunity for questions and acknowledgement of understanding      Vitals:  Vitals Value Taken Time   BP 97/59 12/20/22 1452   Temp     Pulse 69 12/20/22 1454   Resp 11 12/20/22 1454   SpO2 97 % 12/20/22 1454   Vitals shown include unvalidated device data.    Electronically Signed By: HEATHER Jama CRNA  December 20, 2022  2:55 PM

## 2022-12-20 NOTE — ANESTHESIA PREPROCEDURE EVALUATION
Anesthesia Pre-Procedure Evaluation    Patient: Marco Hendricks   MRN: [id number] : 1940        Procedure : Procedure(s):  LEFT OPEN TOTAL HIP REVISION ; HEAD AND LINER EXCHANGE ; AND BONE GRAFT OF GREATER TROCHANTER          Past Medical History:   Diagnosis Date     Adenomatous polyp of colon     from H and  P     Calculus of kidney     from H and P     Cataract     from H and P     Coronary atherosclerosis of native coronary artery     from H and P     Essential hypertension     from H and P     Hard of hearing     from H and P     History of basal cell carcinoma     from H and P     History of dysplastic nevus     from H and P     Malignant neoplasm of prostate (H)     from H and P     Migraine without aura and without status migrainosus, not intractable     from H and P     Mixed hyperlipidemia     From H and P     Osteoarthritis     from H and P     Rectal bleeding     from H and P     Screening for colon cancer     from H and P     Seasonal allergic rhinitis     from H and P      Past Surgical History:   Procedure Laterality Date     ORTHOPEDIC SURGERY        No Known Allergies   Social History     Tobacco Use     Smoking status: Not on file     Smokeless tobacco: Not on file   Substance Use Topics     Alcohol use: Not on file      Wt Readings from Last 1 Encounters:   No data found for Wt        Anesthesia Evaluation            ROS/MED HX  ENT/Pulmonary:     (+) allergic rhinitis,  (-) tobacco use and sleep apnea   Neurologic:     (+) migraines,     Cardiovascular:     (+) Dyslipidemia hypertension--CAD ---    METS/Exercise Tolerance:     Hematologic:       Musculoskeletal:       GI/Hepatic:    (-) GERD   Renal/Genitourinary:     (+) Nephrolithiasis ,     Endo:       Psychiatric/Substance Use:       Infectious Disease:       Malignancy:   (+) Malignancy, History of Skin and Prostate.    Other:            Physical Exam    Airway        Mallampati: II   TM distance: > 3 FB   Neck ROM: full   Mouth  opening: > 3 cm    Respiratory Devices and Support         Dental  no notable dental history         Cardiovascular   cardiovascular exam normal          Pulmonary   pulmonary exam normal                OUTSIDE LABS:  CBC:   Lab Results   Component Value Date    WBC 9.8 06/11/2015    HGB 15.9 06/11/2015    HCT 46.2 06/11/2015     06/11/2015     BMP:   Lab Results   Component Value Date     06/11/2015    POTASSIUM 3.8 06/11/2015    CHLORIDE 106 06/11/2015    CO2 20 06/11/2015    BUN 24 06/11/2015    CR 1.02 06/11/2015     (H) 06/11/2015     COAGS: No results found for: PTT, INR, FIBR  POC: No results found for: BGM, HCG, HCGS  HEPATIC:   Lab Results   Component Value Date    ALBUMIN 4.1 06/11/2015    PROTTOTAL 7.9 06/11/2015    ALT 31 06/11/2015    AST 27 06/11/2015    ALKPHOS 76 06/11/2015    BILITOTAL 1.1 06/11/2015     OTHER:   Lab Results   Component Value Date    JN 8.7 06/11/2015       Anesthesia Plan    ASA Status:  2   NPO Status:  NPO Appropriate    Anesthesia Type: Spinal.              Consents    Anesthesia Plan(s) and associated risks, benefits, and realistic alternatives discussed. Questions answered and patient/representative(s) expressed understanding.    - Discussed:     - Discussed with:  Patient         Postoperative Care    Pain management: Multi-modal analgesia.   PONV prophylaxis: Ondansetron (or other 5HT-3)     Comments:                Chadwick Slater MD

## 2022-12-20 NOTE — OP NOTE
POST OPERATIVE NOTE-IMMEDIATE :    Preoperative Diagnosis:  Other mechanical complication of internal left hip prosthesis, initial encounter (H) [T84.551A]    Postoperative Diagnosis:  Metal on metal L MARIJA    Procedures:  Head and liner exchange    Prosthetic Devices:  See scanned implant documents    Surgeon(s) and Assistants (if any):    Surgeon(s):  Francisco Crump MD Muelken, John George    Anesthesia:  Spinal    Drains:  None    Specimens:  Tissue and fluid    Complications:  None.    Findings/Conclusions:  See operative note    Estimated Blood Loss: 50cc       Condition on discharge from OR:  Satisfactory    Plan:   1. Antibiotic Prophylaxis were given Ancef 2 gm. Abx given within 1 hr of surgical incision and discontinued within 24hrs.   2. DVT prophylaxis ASA will be started within 24hrs of surgery.  3. Weight Bearing Weight-bear as tolerated  4. Discharge anticipated date 12/21/22 Home  5. Pain Medication Oxycodone    Francisco Crump MD, MD

## 2022-12-20 NOTE — ANESTHESIA PROCEDURE NOTES
"Intrathecal Procedure Note    Pre-Procedure   Staff -        Anesthesiologist:  Chadwick Slater MD       Performed By: anesthesiologist       Location: OR       Pre-Anesthestic Checklist: patient identified, IV checked, site marked, risks and benefits discussed, informed consent, monitors and equipment checked, pre-op evaluation and at physician/surgeon's request  Timeout:       Correct Patient: Yes        Correct Procedure: Yes        Correct Site: Yes        Correct Position: Yes   Procedure Documentation  Procedure: intrathecal       Patient Position: sitting       Patient Prep/Sterile Barriers: sterile gloves, mask, patient draped       Skin prep: Chloraprep (midline approach).       Needle Gauge: 24.        Needle Length (Inches): 4        Spinal Needle Type: Pencan       Introducer used       Introducer: 20 G       # of attempts: 1 and  # of redirects:  0    Assessment/Narrative         Paresthesias: No.       CSF fluid: clear.       Opening pressure was cmH2O while  Sitting.       Comments:  Patient tolerated the procedure well      FOR [facility] ([facility]) ONLY:   Pain Team Contact information: please page the Pain Team Via [employer]. Search \"Pain\". During daytime hours, please page the attending first. At night please page the resident first.    "

## 2022-12-20 NOTE — INTERVAL H&P NOTE
"I have reviewed the surgical (or preoperative) H&P that is linked to this encounter, and examined the patient. There are no significant changes    Clinical Conditions Present on Arrival:  Clinically Significant Risk Factors Present on Admission                   # Drug Induced Platelet Defect: home medication list includes an antiplatelet medication  # Overweight: Estimated body mass index is 29.56 kg/m  as calculated from the following:    Height as of this encounter: 1.753 m (5' 9\").    Weight as of this encounter: 90.8 kg (200 lb 3.2 oz).       "

## 2022-12-21 ENCOUNTER — APPOINTMENT (OUTPATIENT)
Dept: PHYSICAL THERAPY | Facility: CLINIC | Age: 82
DRG: 468 | End: 2022-12-21
Attending: ORTHOPAEDIC SURGERY
Payer: COMMERCIAL

## 2022-12-21 ENCOUNTER — APPOINTMENT (OUTPATIENT)
Dept: OCCUPATIONAL THERAPY | Facility: CLINIC | Age: 82
DRG: 468 | End: 2022-12-21
Attending: ORTHOPAEDIC SURGERY
Payer: COMMERCIAL

## 2022-12-21 VITALS
WEIGHT: 200.2 LBS | RESPIRATION RATE: 16 BRPM | OXYGEN SATURATION: 95 % | HEIGHT: 69 IN | DIASTOLIC BLOOD PRESSURE: 82 MMHG | HEART RATE: 95 BPM | BODY MASS INDEX: 29.65 KG/M2 | SYSTOLIC BLOOD PRESSURE: 154 MMHG | TEMPERATURE: 97.9 F

## 2022-12-21 LAB
ANION GAP SERPL CALCULATED.3IONS-SCNC: 8 MMOL/L (ref 3–14)
BUN SERPL-MCNC: 19 MG/DL (ref 7–30)
CALCIUM SERPL-MCNC: 9.1 MG/DL (ref 8.5–10.1)
CHLORIDE BLD-SCNC: 106 MMOL/L (ref 94–109)
CO2 SERPL-SCNC: 24 MMOL/L (ref 20–32)
CREAT SERPL-MCNC: 1.05 MG/DL (ref 0.66–1.25)
GFR SERPL CREATININE-BSD FRML MDRD: 72 ML/MIN/1.73M2
GLUCOSE BLD-MCNC: 137 MG/DL (ref 70–99)
GLUCOSE BLD-MCNC: 137 MG/DL (ref 70–99)
HGB BLD-MCNC: 12.5 G/DL (ref 13.3–17.7)
POTASSIUM BLD-SCNC: 4.2 MMOL/L (ref 3.4–5.3)
SODIUM SERPL-SCNC: 138 MMOL/L (ref 133–144)

## 2022-12-21 PROCEDURE — 258N000003 HC RX IP 258 OP 636: Performed by: PHYSICIAN ASSISTANT

## 2022-12-21 PROCEDURE — 97165 OT EVAL LOW COMPLEX 30 MIN: CPT | Mod: GO

## 2022-12-21 PROCEDURE — 97161 PT EVAL LOW COMPLEX 20 MIN: CPT | Mod: GP

## 2022-12-21 PROCEDURE — 97116 GAIT TRAINING THERAPY: CPT | Mod: GP

## 2022-12-21 PROCEDURE — 250N000011 HC RX IP 250 OP 636: Performed by: PHYSICIAN ASSISTANT

## 2022-12-21 PROCEDURE — 250N000013 HC RX MED GY IP 250 OP 250 PS 637: Performed by: PHYSICIAN ASSISTANT

## 2022-12-21 PROCEDURE — 85018 HEMOGLOBIN: CPT | Performed by: PHYSICIAN ASSISTANT

## 2022-12-21 PROCEDURE — 97535 SELF CARE MNGMENT TRAINING: CPT | Mod: GO

## 2022-12-21 PROCEDURE — 80048 BASIC METABOLIC PNL TOTAL CA: CPT | Performed by: PHYSICIAN ASSISTANT

## 2022-12-21 PROCEDURE — 36415 COLL VENOUS BLD VENIPUNCTURE: CPT | Performed by: PHYSICIAN ASSISTANT

## 2022-12-21 RX ORDER — OXYCODONE HYDROCHLORIDE 5 MG/1
5 TABLET ORAL EVERY 4 HOURS PRN
Qty: 26 TABLET | Refills: 0 | Status: SHIPPED | OUTPATIENT
Start: 2022-12-21 | End: 2023-04-24

## 2022-12-21 RX ORDER — ASPIRIN 81 MG/1
162 TABLET ORAL 2 TIMES DAILY
Qty: 120 TABLET | Refills: 0 | Status: SHIPPED | OUTPATIENT
Start: 2022-12-21 | End: 2022-12-21

## 2022-12-21 RX ORDER — CELECOXIB 200 MG/1
200 CAPSULE ORAL DAILY
Qty: 30 CAPSULE | Refills: 0 | Status: SHIPPED | OUTPATIENT
Start: 2022-12-21 | End: 2023-04-24

## 2022-12-21 RX ORDER — ACETAMINOPHEN 325 MG/1
650 TABLET ORAL EVERY 4 HOURS PRN
Qty: 100 TABLET | Refills: 0 | Status: SHIPPED | OUTPATIENT
Start: 2022-12-21 | End: 2023-02-20

## 2022-12-21 RX ORDER — AMOXICILLIN 250 MG
1-2 CAPSULE ORAL 2 TIMES DAILY
Qty: 100 TABLET | Refills: 0 | Status: SHIPPED | OUTPATIENT
Start: 2022-12-21 | End: 2023-04-24

## 2022-12-21 RX ADMIN — ACETAMINOPHEN 975 MG: 325 TABLET, FILM COATED ORAL at 01:25

## 2022-12-21 RX ADMIN — SODIUM CHLORIDE, POTASSIUM CHLORIDE, SODIUM LACTATE AND CALCIUM CHLORIDE: 600; 310; 30; 20 INJECTION, SOLUTION INTRAVENOUS at 00:15

## 2022-12-21 RX ADMIN — ACETAMINOPHEN 975 MG: 325 TABLET, FILM COATED ORAL at 09:07

## 2022-12-21 RX ADMIN — CEFAZOLIN SODIUM 2 G: 2 INJECTION, SOLUTION INTRAVENOUS at 03:34

## 2022-12-21 RX ADMIN — CELECOXIB 100 MG: 100 CAPSULE ORAL at 01:26

## 2022-12-21 RX ADMIN — FEXOFENADINE HCL 180 MG: 180 TABLET ORAL at 09:08

## 2022-12-21 RX ADMIN — ASPIRIN 162 MG: 81 TABLET, COATED ORAL at 09:07

## 2022-12-21 RX ADMIN — CELECOXIB 100 MG: 100 CAPSULE ORAL at 09:08

## 2022-12-21 ASSESSMENT — ACTIVITIES OF DAILY LIVING (ADL)
ADLS_ACUITY_SCORE: 20

## 2022-12-21 NOTE — PLAN OF CARE
Pt is doing great. Vitally stable on RA. Stands at bedside,urinal voiding during the night. Pt has not been seen by PT, but nursing did get pt up and ambulated in hdez. Moves very well as stby with walker. Denies pain, ice to incision. Incision is glued and open to air. Denies n/t to lower extremities. Tolerating diet. Pt plans to discharge today and stay the first night with his daughter.

## 2022-12-21 NOTE — ANESTHESIA POSTPROCEDURE EVALUATION
Patient: Marco Hendricks    Procedure: Procedure(s):  LEFT OPEN TOTAL HIP REVISION ; HEAD AND LINER EXCHANGE       Anesthesia Type:  Spinal    Note:     Postop Pain Control: Uneventful            Sign Out: Well controlled pain   PONV: No   Neuro/Psych: Uneventful            Sign Out: Acceptable/Baseline neuro status   Airway/Respiratory: Uneventful            Sign Out: Acceptable/Baseline resp. status   CV/Hemodynamics: Uneventful            Sign Out: Acceptable CV status; No obvious hypovolemia; No obvious fluid overload   Other NRE: NONE   DID A NON-ROUTINE EVENT OCCUR? No           Last vitals:  Vitals Value Taken Time   /90 12/20/22 1640   Temp 36.3  C (97.4  F) 12/20/22 1500   Pulse 79 12/20/22 1642   Resp 13 12/20/22 1642   SpO2 97 % 12/20/22 1643   Vitals shown include unvalidated device data.    Electronically Signed By: Aamir Francois MD  December 20, 2022  8:10 PM

## 2022-12-21 NOTE — PLAN OF CARE
Physical Therapy Discharge Summary    Reason for therapy discharge:    All goals and outcomes met, no further needs identified.    Progress towards therapy goal(s). See goals on Care Plan in [facility] electronic health record for goal details.  Goals met    Therapy recommendation(s):    No further therapy is recommended.

## 2022-12-21 NOTE — PLAN OF CARE
Date & Shift:  12/21/2022 day shift   Diagnosis:  L TH revision   Procedure:  POD 1 L TH revision   Orientation:  a&ox4   VS/O2:  vss on room air   Activity:  sba   Diet:  regg   Pain Management:  tylenol / celebrex   Bowel & Bladder:  urinal or bathroom   Skin:  L hip incision CDI   Tele:  na   IV: iv removed   Consults:  ortho   Discharge:  going home with daughter   Other:  All belongings returned to patient, discharge instruction gone over, meds given, pt discharging with walker and OT supplies, all questions answered.

## 2022-12-21 NOTE — PROGRESS NOTES
12/21/22 1000   Appointment Info   Signing Clinician's Name / Credentials (OT) Amira Deras OTR/L   Living Environment   People in Home alone   Current Living Arrangements apartment   Home Accessibility no concerns   Number of Stairs, Within Home, Primary none   Stair Railings, Within Home, Primary none   Transportation Anticipated car, drives self;family or friend will provide   Living Environment Comments Pt will be staying with daughter for a couple of nights and her home has 3 MARRY then will stay on main. Once home, pt will use walk-in shower with sliding door, has comfort ht toilet seat.   Self-Care   Usual Activity Tolerance good   Current Activity Tolerance moderate   Regular Exercise Yes   Activity/Exercise Type other (see comments)  (Elliptical and treadmill @ [employer] 3x/week)   Exercise Amount/Frequency 3-5 times/wk   Equipment Currently Used at Home none   Fall history within last six months no   Activity/Exercise/Self-Care Comment Basesline indep with all ADL, IADL, mobility no AE/AD.   General Information   Onset of Illness/Injury or Date of Surgery 12/20/22   Referring Physician Leopoldo Huston PA-C   Patient/Family Therapy Goal Statement (OT) will go to daughter's home for 1-3 days then home   Additional Occupational Profile Info/Pertinent History of Current Problem POD #1 L MARIJA revision   Existing Precautions/Restrictions no hip ER;no hip ADD past midline;90 degree hip flexion   Left Lower Extremity (Weight-bearing Status) weight-bearing as tolerated (WBAT)   Cognitive Status Examination   Orientation Status orientation to person, place and time   Affect/Mental Status (Cognitive) WFL   Follows Commands WFL   Pain Assessment   Patient Currently in Pain No   Bed Mobility   Comment (Bed Mobility) Independent   Transfers   Transfers sit-stand transfer;toilet transfer   Sit-Stand Transfer   Sit-Stand [place] (Transfers) supervision   Assistive Device (Sit-Stand Transfers) walker  front-wheeled   Toilet Transfer   Type (Toilet Transfer) sit-stand;stand-sit   [place] Level (Toilet Transfer) supervision   Activities of Daily Living   BADL Assessment/Intervention lower body dressing;toileting   Lower Body Dressing Assessment/Training   Position (Lower Body Dressing) edge of bed sitting   [place] Level (Lower Body Dressing) moderate assist (50% patient effort);set up   Toileting   Assistive Devices (Toileting) grab bar, toilet   [place] Level (Toileting) contact guard assist;supervision   Clinical Impression   Criteria for Skilled Therapeutic Interventions Met (OT) Yes, treatment indicated   OT Diagnosis decreased indep with ADL, IADL and mobility   Influenced by the following impairments post-op total hip precautions   OT Problem List-Impairments impacting ADL problems related to;balance;post-surgical precautions   Assessment of Occupational Performance 3-5 Performance Deficits   Identified Performance Deficits functional mobility, dressing, toileting, HH mgmt   Planned Therapy Interventions (OT) ADL retraining;IADL retraining;transfer training   Anticipated Equipment Needs Upon Discharge (OT) dressing equipment   Risk & Benefits of therapy have been explained evaluation/treatment results reviewed;care plan/treatment goals reviewed;risks/benefits reviewed;current/potential barriers reviewed;participants voiced agreement with care plan;participants included;patient;daughter   OT Total Evaluation Time   OT Eval, Low Complexity Minutes (25247) 15   OT Goals   Therapy Frequency (OT) One time eval and treatment   OT Predicted Duration/Target Date for Goal Attainment 12/21/22   OT Goals Lower Body Dressing;Transfers;Toilet Transfer/Toileting;OT Goal 1   OT: Lower Body Dressing Modified independent;within precautions;using adaptive equipment;including set-up/clothing retrieval   OT: Transfer   (Pt will verbalize understanding of safe method for shower transfer)   OT: Toilet  Transfer/Toileting Modified independent;toilet transfer;cleaning and garment management;using adaptive equipment;within precautions   OT: Goal 1   (Pt will demo/verbalize understanding of incorporation of safe walker use and post-op total hip precautions with functional transfers and retreival of ADL supplies 100% of trials to be able to manage safely in home setting.)   Self-Care/Home Management   Self-Care/Home Mgmt/ADL, Compensatory, Meal Prep Minutes (75053) 42   Treatment Detail/Skilled Intervention OT: training provided to patient and daughter in post-op total hip precautions and modified techniques to perform self-care tasks, functional transfers, simple HH tasks and retrieval of ADL supplies while at same time using walker safely throughout to maximize safety and indep in home and community setting. pt somewhat impulsive and required several reminders throughout session to avoid hazardous positions of LLE and to slow down, use walker safely. Following therapist's verbal and visual training he was then able to dress himself using adaptive tools w/Mod I and reacher, long shoe horn and sock-aid issued for use. After two practice trials with instruction he also completed toilet transfer simulating his home setup with Mod I. OT demonstrated safe transfer in/out of walk-in shower and he and daughter bala balized understanding. Pt also return demonstrated safe reach/retrieval of supplies following instruction. Daughter reports she will provide reminders if needed and even once pt returns home shelives near by to assist as needed and provide transport until pt can resume driving.   OT Discharge Planning   OT Rationale for DC Rec Pt to discharge home to daughter's for 1-3 days and daughter verbalized understanding of need for pt to maintain post-op total hip precautions and that he may need reminders to do so.   OT Brief overview of current status OT eval and treat completed and pt has met all OT goals. No further  skilled OT intervention needed.   Total Session Time   Timed Code Treatment Minutes 42   Total Session Time (sum of timed and untimed services) 57

## 2022-12-21 NOTE — PROGRESS NOTES
12/21/22 0820   Appointment Info   Signing Clinician's Name / Credentials (PT) Jamil Chino DPT       Present no   Living Environment   People in Home alone   Current Living Arrangements apartment   Home Accessibility no concerns   Number of Stairs, Within Home, Primary none   Stair Railings, Within Home, Primary none   Transportation Anticipated car, drives self;family or friend will provide   Living Environment Comments Pt lives in an alone in an apartment with no stairs to enter with elevator access. Will be staying with his daughter for a couple nights and her home has 3 MARRY with handrail on R side. All necessary amenities on main level of daughter's home. Pt drives at baseline, but daughter will provide rides after d/c   Self-Care   Usual Activity Tolerance good   Current Activity Tolerance moderate   Equipment Currently Used at Home none   Fall history within last six months no   Activity/Exercise/Self-Care Comment Pt is IND at baseline with all ADLs. No use of AD for mobility.   General Information   Onset of Illness/Injury or Date of Surgery 12/20/22   Referring Physician Leopoldo Huston PA-C   Patient/Family Therapy Goals Statement (PT) return home   Pertinent History of Current Problem (include personal factors and/or comorbidities that impact the POC) pt is POD #1 L MARIJA revision.   Existing Precautions/Restrictions no hip ER;no hip ADD past midline;90 degree hip flexion   Weight-Bearing Status - LLE weight-bearing as tolerated   Cognition   Affect/Mental Status (Cognition) WNL   Orientation Status (Cognition) oriented x 4   Integumentary/Edema   Integumentary/Edema no deficits were identifed   Integumentary/Edema Comments L hip incision covered with post-op bandages. no signs of new bleeding.   Posture    Posture Not impaired   Range of Motion (ROM)   Range of Motion ROM deficits secondary to surgical procedure   ROM Comment L hip AROM mildly limited secondary to surgical  procedure. Pain and swelling   Strength (Manual Muscle Testing)   Strength (Manual Muscle Testing) strength is WFL   Bed Mobility   Comment, (Bed Mobility) IND   Transfers   Comment, (Transfers) SBA w/ FWW for sit>stand   Gait/Stairs (Locomotion)   Distance in Feet 10' eval   Distance in Feet (Gait) 300'   Comment, (Gait/Stairs) pt ambulated 10' w/ FWW for evaluation. demonstrating steady pacing with good gait speed. forward lean noted   Balance   Balance Comments mild dynamic balance impairment secondary to needing FWW for mobility   Sensory Examination   Sensory Perception patient reports no sensory changes   Clinical Impression   Criteria for Skilled Therapeutic Intervention Yes, treatment indicated   PT Diagnosis (PT) impaired functional mobility   Influenced by the following impairments impaired activity tolerance, post-op pain and swelling, mild dynamic balance impairment   Functional limitations due to impairments limited independence with functional mobility   Clinical Presentation (PT Evaluation Complexity) Stable/Uncomplicated   Clinical Presentation Rationale clinical judgement   Clinical Decision Making (Complexity) low complexity   Planned Therapy Interventions (PT) balance training;bed mobility training;gait training;patient/family education;stair training;strengthening;transfer training;progressive activity/exercise;home program guidelines   Anticipated Equipment Needs at Discharge (PT) walker, rolling   Risk & Benefits of therapy have been explained evaluation/treatment results reviewed;care plan/treatment goals reviewed;risks/benefits reviewed;current/potential barriers reviewed;participants voiced agreement with care plan;participants included;patient;daughter   PT Total Evaluation Time   PT Eval, Low Complexity Minutes (79025) 5   Plan of Care Review   Plan of Care Reviewed With patient;daughter   Physical Therapy Goals   PT Frequency One time eval and treatment only   PT Predicted Duration/Target  Date for Goal Attainment 12/21/22   PT Goals Bed Mobility;Transfers;Gait;Stairs   PT: Bed Mobility Independent;Supine to/from sit;Completed;Goal Met   PT: Transfers Supervision/stand-by assist;Sit to/from stand;Bed to/from chair;Assistive device;Completed;Goal Met   PT: Gait Supervision/stand-by assist;Rolling walker;Greater than 200 feet;Completed;Goal Met   PT: Stairs Supervision/stand-by assist;3 stairs;Rail on right   Interventions   Interventions Quick Adds Gait Training;Therapeutic Activity   Therapeutic Activity   Therapeutic Activities: dynamic activities to improve functional performance Minutes (98033) 5   Symptoms Noted During/After Treatment None   Treatment Detail/Skilled Intervention Greeted pt upon arrival to room. Pt agreeable to working with PT. PT evaluation completed and role of IP PT explained to pt. Adult daughter present in room throughout session. Post-op weight bearing as tolerated and hip precautions reviewed including no hip ER, no adduction past midline, and no hip flexion greater than 90 degrees. Supine>sit with SBA. Cueing to maintain hip movement precautions. Sit>stand w/ FWW and SBA. Cueing for safe and efficient sit<>stand procedure including scooting to the front edge of the chair, to lean forward with nose over toes, and hand and foot placement. Following ambulation, pt stand>sit>supine IND. Questions answered from daughter and pt regarding outpatient PT recommendations and guidelines for continued mobility at home. Pt left supine in bed with all needs met, call light within reach, and RN updated.   Gait Training   Gait Training Minutes (41575) 12   Symptoms Noted During/After Treatment (Gait Training) none   Treatment Detail/Skilled Intervention Pt ambulated 300' w/ FWW and SBA. Demonstrating mild forward flexed posture with cueing to stand tall with head and eyes upward with improved upright posture. Noting steady pacing with good gait speed. At times, cueing to slow down and keep  "FWW close to patient's body. Pt ascended and descended 3 stairs with handrail support on R side and SBA. Cueing for \"up with the good and down with the bad\". Demonstrating good safety and stability while navigating stairs.   PT Discharge Planning   PT Plan goals met. d/c from PT   PT Rationale for DC Rec Pt is below baseline mobility levels at this time, but is demonstrating IND or SBA with all functional mobility at this time. Recommend discharge home with assist from daughter and eventual return to patient's apartment. Pt demonstrated ability to walk 300' with FWW and navigate 3 stairs necessary to enter daughter's home.   PT Brief overview of current status SBA w/ FWW for ambulation and stair navigation, IND bed mobility   Total Session Time   Timed Code Treatment Minutes 17   Total Session Time (sum of timed and untimed services) 22     "

## 2022-12-21 NOTE — PROGRESS NOTES
Hospitalist service following peripherally.    BP and heart rate stable. No further recommendations.    hgb is 12.5 this AM. Pre-op hgb was 14.2 on 11/29. Defer to ortho if further monitoring or workup is indicated based on intra-op blood loss that was reported as 50ml.    - no further recommendations    Marvel Juan MD

## 2022-12-21 NOTE — PROGRESS NOTES
"ORTHOPEDIC LOWER EXTREMITY PROGRESS NOTE    POD#1  Patient is a 82 year old male who underwent Procedure(s):  LEFT OPEN TOTAL HIP REVISION ; HEAD AND LINER EXCHANGE on 2022. Pain is well controlled. Tolerating medication well, no nausea or vomiting.  No events overnight.    Vitals:   Blood pressure (!) 154/82, pulse 95, temperature 97.9  F (36.6  C), temperature source Oral, resp. rate 16, height 1.753 m (5' 9\"), weight 90.8 kg (200 lb 3.2 oz), SpO2 95 %.  Temp (24hrs), Av.9  F (36.6  C), Min:97.4  F (36.3  C), Max:98.6  F (37  C)      Drains: None    EXAM   The patient is awake and alert.  Calves are soft and non-tender.   Sensation is intact.  Dorsiflexion and plantar flexion is intact.  Foot warm with nl cap refill.  The incision is covered with prineo dressing,CDI.     Labs:   Recent Labs   Lab Test 22  0749 06/11/15  2240   HGB 12.5* 15.9       ASSESSMENT  S/p L MARIJA revision, head and liner exchange   PLAN  1. DVT prophylaxis: aspirin  2. Weight Bearing: WBAT (Weight bearing as tolerated).  3. Anticipated discharge date today . Discharge to Home with No Skilled Service.  4. Cont Pain Control Oxycodone and Tylenol    Roxanne Long PA-C  [facility]        "

## 2022-12-22 NOTE — DISCHARGE SUMMARY
HOSPITAL DISCHARGE SUMMARY    Patient Name: Marco Hendricks  YOB: 1940  Age: 82 year old  Medical Record Number: [id number]  Primary Physician: Xiang Jaffe  Phone: [phone]  Admission Date: 12/20/2022  Discharge Date: 12/21/2022    He will be discharged from [facility] to Home with No Skilled Service    PRINCIPAL DISCHARGE DIAGNOSIS:    1.  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  2.  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    Patient Active Problem List   Diagnosis     Essential hypertension     Hard of hearing     Seasonal allergic rhinitis     Mixed hyperlipidemia     Osteoarthritis     History of basal cell carcinoma     Calculus of kidney     Migraine without aura and without status migrainosus, not intractable     Malignant neoplasm of prostate (H)     Coronary atherosclerosis of native coronary artery     Cataract     Screening for colon cancer     History of dysplastic nevus     Rectal bleeding     Adenomatous polyp of colon     Status post revision of total hip replacement        PROCEDURES PERFORMED DURING HOSPITALIZATION: Total hip arthroplasty revision, left side    BRIEF HOSPITAL COURSE: Mr. Hendricks is an 82-year-old male who is still extremely active and healthy.  He underwent a total hip by Dr. Crump in 2010 with the above articulations.  Given what we know with metal-on-metal hips.  We have been following Mr. Hendricks take on an annual basis, but did notice in the last year that his ions did drop significantly.  In addition, a MARs MRI was performed, which also showed increased signal uptake around.  Clinically, he had no pain, no fevers, no chills.  Preoperative labs did not indicate any infection.  We discussed treatment options and felt that he would benefit from a head and liner exchange and possible bone grafting of the greater trochanter if there was enough degeneration.  The  "risks, benefits and possible complications of the above procedure were discussed with the patient. Patient elected to proceed to improve their lifestyle. Informed consent was obtained. For further details, please refer to the H&P in the chart.    The patient was admitted on 12/20/2022 and underwent the above-mentioned procedure. Patient tolerated this procedure well. Postoperatively, patient was seen in consultation by the internal medicine hospitalist service. Throughout the hospitalization, wound remained clean. The patient was started and advanced in a diet. CMS remained intact. Patient was seen and evaluated by physical therapy and rehab program was initiated. Patient was also seen by occupational therapy. Hemoglobin on day of discharge was stable.    COMPLICATIONS IN HOSPITAL: None    PERTINENT FINDINGS/RESULTS AT DISCHARGE:   Blood pressure (!) 154/82, pulse 95, temperature 97.9  F (36.6  C), temperature source Oral, resp. rate 16, height 1.753 m (5' 9\"), weight 90.8 kg (200 lb 3.2 oz), SpO2 95 %.      Subjective: Patient feels good today.  Pain controlled. Discharge instructions reviewed.      PE:   The patient is awake and alert  Calves are soft and non-tender.   Sensation is intact.  Dorsiflexion and plantar flexion is intact.  Foot warm with nl cap refill.  The incision is incision: covered.      Latest Laboratory Results:  Chem:  Recent Labs   Lab Test 12/21/22  0749 12/20/22  1154   POTASSIUM 4.2 3.7     WBC/Hgb:  Recent Labs   Lab Test 12/21/22  0749 06/11/15  2240   WBC  --  9.8   HGB 12.5* 15.9     INR:  No results for input(s): INR in the last 43636 hours.  No components found for: [id number]    IMPORTANT PENDING TEST RESULTS: None    CONDITION AT DISCHARGE:    Stabilized    DISCHARGE ORDERS    Discharge Orders        Reason for your hospital stay    Revision left total hip arthroplasty     When to call - Contact Surgeon Team    You may experience symptoms that require follow-up before your " "scheduled appointment. Refer to the \"[employer]\" for instructions on when to contact your Surgeon Team if you are concerned about pain control, blood clots, constipation, or if you are unable to urinate.     When to call - Reach out to Urgent Care    If you are not able to reach your Surgeon Team and you need immediate care, go to the Orthopedic Walk-in Clinic or Urgent Care at your Surgeon's office.  Do NOT go to the Emergency Room unless you have shortness of breath, chest pain, or other signs of a medical emergency.     When to call - Reasons to Call 911    Call 911 immediately if you experience sudden-onset chest pain, arm weakness/numbness, slurred speech, or shortness of breath     Discharge Instruction - Breathing exercises    Perform breathing exercises using your Incentive Spirometer 10 times per hour while awake for 2 weeks.     Symptoms - Fever Management    A low grade fever can be expected after surgery.  Use acetaminophen (TYLENOL) as needed for fever management.  Contact your Surgeon Team if you have a fever greater than 101.5 F, chills, and/or night sweats.     Symptoms - Constipation management    Constipation (hard, dry bowel movements) is expected after surgery due to the combination of being less active, the anesthetic, and the opioid pain medication.  You can do the following to help reduce constipation:  ~  FLUIDS:  Drink clear liquids (water or Gatorade), or juice (apple/prune).  ~  DIET:  Eat a fiber rich diet.    ~  ACTIVITY:  Get up and move around several times a day.  Increase your activity as you are able.  MEDICATIONS:  Reduce the risk of constipation by starting medications before you are constipated.  You can take Miralax   (1 packet as directed) and/or a stool softener (Senokot 1-2 tablets 1-2 times a day).  If you already have constipation and these medications are not working, you can get magnesium citrate and use as directed.  If you continue to have constipation you can try " an over the counter suppository or enema.  Call your Surgeon Team if it has been greater than 3 days since your last bowel movement.     Symptoms - Reduced Urine Output    Changes in the amount of fluids you drank before and after surgery may result in problems urinating.  It is important to stay well-hydrated after surgery and drink plenty of water. If it has been greater than 8 hours since you have urinated despite drinking plenty of water, call your Surgeon Team.     Activity - Exercises to prevent blood clots    Unless otherwise directed by your Surgeon team, perform the following exercises at least three times per day for the first four weeks after surgery to prevent blood clots in your legs: 1) Point and flex your feet (Ankle Pumps), 2) Move your ankle around in big circles, 3) Wiggle your toes, 4) Walk, even for short distances, several times a day, will help decrease the risk of blood clots.     Comfort and Pain Management - Pain after Surgery    Pain after surgery is normal and expected.  You will have some amount of pain for several weeks after surgery.  Your pain will improve with time.  There are several things you can do to help reduce your pain including: rest, ice, elevation, and using pain medications as needed. Contact your Surgeon Team if you have pain that persists or worsens after surgery despite rest, ice, elevation, and taking your medication(s) as prescribed. Contact your Surgeon Team if you have new numbness, tingling, or weakness in your operative extremity.     Comfort and Pain Management - Swelling after Surgery    Swelling and/or bruising of the surgical extremity is common and may persist for several months after surgery. In addition to frequent icing and elevation, gentle compressive support with an ACE wrap or tubigrip may help with swelling. Apply compression regularly, removing at least twice daily to perform skin checks. Contact your Surgeon Team if your swelling increases and is  "NOT associated with an increase in your activity level, or if your swelling increases and is associated with redness and pain.     Comfort and Pain Management - LOWER Extremity Elevation    Swelling is expected for several months after surgery. This type of swelling is usually associated with gravity and activity, and can be improved with elevation.   The best way to do this is to get your \"toes above your nose\" by laying down and placing several pillows lengthwise under your calf and heel. When elevating your leg keep your knee completely straight. Perform this elevation as often as possible especially for the first two weeks after surgery.     Comfort and Pain Management - Cold therapy    Ice can be used to control swelling and discomfort after surgery. Place a thin towel over your operative site and apply the ice pack overtop. Leave ice pack in place for 20 minutes, then remove for 20 minutes. Repeat this 20 minutes on/20 minutes off routine as often as tolerated.     Medication Instructions - Acetaminophen (TYLENOL) Instructions    You were discharged with acetaminophen (TYLENOL) for pain management after surgery. Acetaminophen most effectively manages pain symptoms when it is taken on a schedule without missing doses (every four, six, or eight hours). Your Provider will prescribe a safe daily dose between 3000 - 4000 mg.  Do NOT exceed this daily dose. Most patients use acetaminophen for pain control for the first four weeks after surgery.  You can wean from this medication as your pain decreases.     Medication Instructions - NSAID Instructions    You were discharged with an anti-inflammatory medication for pain management to use in combination with acetaminophen (TYLENOL) and the narcotic pain medication.  Take this medication exactly as directed.  You should only take one anti-inflammatory at a time.  Some common anti-inflammatories include: ibuprofen (ADVIL, MOTRIN), naproxen (ALEVE, NAPROSYN), celecoxib " "(CELEBREX), meloxicam (MOBIC), ketorolac (TORADOL).  Take this medication with food and water.     Medication Instructions - Opioid Instructions (Greater than or equal to 65 years)    You were discharged with an opioid medication (hydromorphone, oxycodone, hydrocodone, or tramadol). This medication should only be taken for breakthrough pain that is not controlled with acetaminophen (TYLENOL). If you rate your pain less than 3 you do not need this medication.  Pain rating 0-3:  You do not need this medication  Pain rating 4-6:  Take 1/2 tablet every 4-6 hours as needed  Pain rating 7-10:  Take 1 tablet every 4-6 hours as needed  Do not exceed 4 tablets per day     Medication Instructions - Opioids - Tapering Instructions    In the first three days following surgery, your symptoms may warrant use of the narcotic pain medication every four to six hours as prescribed. This is normal. As your pain symptoms improve, focus your efforts on decreasing (tapering) use of narcotic medications. The most successful tapering strategy is to first, decrease the number of tablets you take every 4-6 hours to the minimum prescribed. Then, increase the amount of time between doses.  For example:  First, taper to   or 1 tablet every 4-6 hours.  Then, taper to   or 1 tablet every 6-8 hours.  Then, taper to   or 1 tablet every 8-10 hours.  Then, taper to   or 1 tablet every 10-12 hours.  Then, taper to   or 1 tablet at bedtime.  The bedtime dose can help with comfort during sleep and is typically the last dose to be discontinued after surgery.     Follow Up Care    You have a follow-up appointment scheduled with Dr. Crump's team in 10-14 days. Please call with questions. [phone]     Activity - Total Hip Arthroplasty    Refer to the [facility] \"Your Guide to Total Joint Replacement\" for recommendations on activities and Exercises.     Return to Driving    Return to driving - Driving is NOT permitted until directed by your " provider. Under no circumstance are you permitted to drive while using narcotic pain medications.     No flexion past 90 degrees    No bending at waist past 90 degrees.     No external rotation    No pivoting on your operative leg.     No adduction past midline    No crossing your operative leg.     Weight bearing as tolerated    Weight bearing as tolerated on your operative extremity.     Dressing / Wound Care - Wound    You have a clean dressing on your surgical wound. Dressing change instructions as follows: dressing will be removed at your follow-up appointment. Contact your Surgeon Team if you have increased redness, warmth around the surgical wound, and/or drainage from the surgical wound.     Dressing Wound Care - Shower with wound/dressing NOT covered    You do not need to cover your dressing or incision in the shower, you may allow water and soap to run over top of the surgical dressing or incision. You may shower 2 days after surgery.  You are strictly prohibited from soaking or submerging the surgical wound underwater.     Dressing / Wound Care - NO Tub Bathing    Tub bathing, swimming, or any other activities that will cause your incision to be submerged in water should be avoided until allowed by your Surgeon.     Medication instructions -  Anticoagulation - aspirin    Take the aspirin as prescribed for a total of eight weeks after surgery. You may resume your regular dose after 8 weeks. This is given to help minimize your risk of blood clot.     [facility]    DME Documentation: Describe the reason for need to support medical necessity: Impaired gait status post hip surgery. I, the undersigned, certify that the above prescribed supplies are medically necessary for this patient and is both reasonable and necessary in reference to accepted standards of medical practice in the treatment of this patient's condition and is not prescribed as a convenience.     [facility]    DME Documentation: Describe the  reason for need to support medical necessity: Impaired gait status post hip surgery. I, the undersigned, certify that the above prescribed supplies are medically necessary for this patient and is both reasonable and necessary in reference to accepted standards of medical practice in the treatment of this patient's condition and is not prescribed as a convenience.     [facility]    : DME Documentation: Describe the reason for need to support medical necessity: Impaired gait status post hip surgery. I, the undersigned, certify that the above prescribed supplies are medically necessary for this patient and is both reasonable and necessary in reference to accepted standards of medical practice in the treatment of this patient's condition and is not prescribed as a convenience.     Discharge Instruction - Regular Diet Adult    Return to your pre-surgery diet unless instructed otherwise       Discharge Medications     Discharge Medication List as of 12/21/2022 10:06 AM      CONTINUE these medications which have CHANGED    Details   acetaminophen (TYLENOL) 325 MG tablet Take 2 tablets (650 mg) by mouth every 4 hours as needed for other (mild pain), Disp-100 tablet, R-0, E-Prescribe      !! aspirin (ASA) 81 MG EC tablet Take 1 tablet (81 mg) by mouth daily Resume usual dose of aspirin after finishing increased dose prescribed after surgery, Historical      celecoxib (CELEBREX) 200 MG capsule Take 1 capsule (200 mg) by mouth daily Do not take within 6 hours of ibuprofen (MOTRIN, ADVIL) or ketorolac (TORADOL) if prescribed., Disp-30 capsule, R-0, E-Prescribe      oxyCODONE (ROXICODONE) 5 MG tablet Take 1 tablet (5 mg) by mouth every 4 hours as needed for moderate to severe pain, Disp-26 tablet, R-0, E-Prescribe      senna-docusate (SENOKOT-S/PERICOLACE) 8.6-50 MG tablet Take 1-2 tablets by mouth 2 times daily Take while on oral narcotics to prevent or treat constipation., Disp-100 tablet, R-0, E-Prescribe      !! aspirin 81  MG EC tablet Take 2 tablets (162 mg) by mouth 2 times daily, Disp-120 tablet, R-0, E-Prescribe       !! - Potential duplicate medications found. Please discuss with provider.      CONTINUE these medications which have NOT CHANGED    Details   Atorvastatin Calcium (LIPITOR PO) Take 20 mg by mouth every evening, Historical      calcium carbonate (OS- MG [place]. [place]) 500 MG tablet Take 500 mg by mouth daily, Historical      diphenhydrAMINE-acetaminophen (TYLENOL PM)  MG tablet Take 1 tablet by mouth nightly as needed for sleep, Historical      fexofenadine (ALLEGRA) 180 MG tablet Take 180 mg by mouth every morning, Historical      losartan (COZAAR) 100 MG tablet Take 100 mg by mouth daily, Historical      Omega-3 Fatty Acids (OMEGA-3 FISH OIL) 1200 MG CAPS Take 3 capsules by mouth daily, Historical               After Discharge Recommendations:  1. Resume pre-admission diet  2. DVT prophylaxis: aspirin  3. Follow up: He should see Dr. Crump in clinic in 1-2wks.  4. Sutures or staples will be removed by orthopedic surgeon at the 10-14 day follow-up appointment.  5. Weight Bearing WBAT (Weight bearing as tolerated).  6. Additional followup: None  7. Special instructions: posterolateral hip precautions x 12 weeks    Total time spent for discharge on date of discharge: 25 minutes    Physician(s) in addition to primary physician who should receive a copy:  Primary Care Physician Xiang Jaffe  Orthopedic Medicine and Surgery -[phone]    FLAKITO Ruiz...................  12/22/2022   6:18 AM

## 2022-12-25 LAB
BACTERIA SNV CULT: NO GROWTH
BACTERIA TISS BX CULT: NO GROWTH

## 2022-12-27 LAB — BACTERIA TISS BX CULT: NORMAL

## 2023-01-03 LAB — BACTERIA SNV CULT: ABNORMAL

## 2023-02-14 LAB
ACID FAST STAIN (ARUP): NORMAL

## 2023-02-16 ENCOUNTER — MEDICAL CORRESPONDENCE (OUTPATIENT)
Dept: HEALTH INFORMATION MANAGEMENT | Facility: CLINIC | Age: 83
End: 2023-02-16

## 2023-02-20 ENCOUNTER — HOSPITAL ENCOUNTER (EMERGENCY)
Facility: CLINIC | Age: 83
Discharge: HOME | End: 2023-02-20
Attending: EMERGENCY MEDICINE | Admitting: EMERGENCY MEDICINE
Payer: COMMERCIAL

## 2023-02-20 ENCOUNTER — APPOINTMENT (OUTPATIENT)
Dept: CT IMAGING | Facility: CLINIC | Age: 83
End: 2023-02-20
Attending: EMERGENCY MEDICINE
Payer: COMMERCIAL

## 2023-02-20 VITALS
TEMPERATURE: 96.4 F | SYSTOLIC BLOOD PRESSURE: 153 MMHG | DIASTOLIC BLOOD PRESSURE: 96 MMHG | OXYGEN SATURATION: 94 % | RESPIRATION RATE: 17 BRPM | HEART RATE: 85 BPM

## 2023-02-20 DIAGNOSIS — R42 DIZZINESS: ICD-10-CM

## 2023-02-20 DIAGNOSIS — B33.8 RSV INFECTION: ICD-10-CM

## 2023-02-20 LAB
ALBUMIN UR-MCNC: NEGATIVE MG/DL
ANION GAP SERPL CALCULATED.3IONS-SCNC: 12 MMOL/L (ref 7–15)
APPEARANCE UR: CLEAR
ATRIAL RATE - MUSE: 85 BPM
BASOPHILS # BLD AUTO: 0 10E3/UL (ref 0–0.2)
BASOPHILS NFR BLD AUTO: 0 %
BILIRUB UR QL STRIP: NEGATIVE
BUN SERPL-MCNC: 15.9 MG/DL (ref 8–23)
CALCIUM SERPL-MCNC: 9.3 MG/DL (ref 8.8–10.2)
CHLORIDE SERPL-SCNC: 104 MMOL/L (ref 98–107)
COLOR UR AUTO: ABNORMAL
CREAT SERPL-MCNC: 1.06 MG/DL (ref 0.67–1.17)
DEPRECATED HCO3 PLAS-SCNC: 25 MMOL/L (ref 22–29)
DIASTOLIC BLOOD PRESSURE - MUSE: NORMAL MMHG
EOSINOPHIL # BLD AUTO: 0.2 10E3/UL (ref 0–0.7)
EOSINOPHIL NFR BLD AUTO: 2 %
ERYTHROCYTE [DISTWIDTH] IN BLOOD BY AUTOMATED COUNT: 13 % (ref 10–15)
FLUAV RNA SPEC QL NAA+PROBE: NEGATIVE
FLUBV RNA RESP QL NAA+PROBE: NEGATIVE
GFR SERPL CREATININE-BSD FRML MDRD: 71 ML/MIN/1.73M2
GLUCOSE SERPL-MCNC: 121 MG/DL (ref 70–99)
GLUCOSE UR STRIP-MCNC: NEGATIVE MG/DL
HCT VFR BLD AUTO: 42.3 % (ref 40–53)
HGB BLD-MCNC: 13.4 G/DL (ref 13.3–17.7)
HGB UR QL STRIP: NEGATIVE
HOLD SPECIMEN: NORMAL
HOLD SPECIMEN: NORMAL
IMM GRANULOCYTES # BLD: 0.1 10E3/UL
IMM GRANULOCYTES NFR BLD: 1 %
INTERPRETATION ECG - MUSE: NORMAL
KETONES UR STRIP-MCNC: NEGATIVE MG/DL
LEUKOCYTE ESTERASE UR QL STRIP: NEGATIVE
LYMPHOCYTES # BLD AUTO: 1.2 10E3/UL (ref 0.8–5.3)
LYMPHOCYTES NFR BLD AUTO: 13 %
MAGNESIUM SERPL-MCNC: 2 MG/DL (ref 1.7–2.3)
MCH RBC QN AUTO: 29.3 PG (ref 26.5–33)
MCHC RBC AUTO-ENTMCNC: 31.7 G/DL (ref 31.5–36.5)
MCV RBC AUTO: 92 FL (ref 78–100)
MONOCYTES # BLD AUTO: 0.8 10E3/UL (ref 0–1.3)
MONOCYTES NFR BLD AUTO: 9 %
MUCOUS THREADS #/AREA URNS LPF: PRESENT /LPF
NEUTROPHILS # BLD AUTO: 6.8 10E3/UL (ref 1.6–8.3)
NEUTROPHILS NFR BLD AUTO: 75 %
NITRATE UR QL: NEGATIVE
NRBC # BLD AUTO: 0 10E3/UL
NRBC BLD AUTO-RTO: 0 /100
P AXIS - MUSE: 12 DEGREES
PH UR STRIP: 6.5 [PH] (ref 5–7)
PLATELET # BLD AUTO: 207 10E3/UL (ref 150–450)
POTASSIUM SERPL-SCNC: 4.7 MMOL/L (ref 3.4–5.3)
PR INTERVAL - MUSE: 174 MS
QRS DURATION - MUSE: 96 MS
QT - MUSE: 360 MS
QTC - MUSE: 428 MS
R AXIS - MUSE: 53 DEGREES
RBC # BLD AUTO: 4.58 10E6/UL (ref 4.4–5.9)
RBC URINE: <1 /HPF
RSV RNA SPEC NAA+PROBE: POSITIVE
SARS-COV-2 RNA RESP QL NAA+PROBE: NEGATIVE
SODIUM SERPL-SCNC: 141 MMOL/L (ref 136–145)
SP GR UR STRIP: 1.01 (ref 1–1.03)
SYSTOLIC BLOOD PRESSURE - MUSE: NORMAL MMHG
T AXIS - MUSE: 32 DEGREES
TROPONIN T SERPL HS-MCNC: 19 NG/L
TROPONIN T SERPL HS-MCNC: 29 NG/L
UROBILINOGEN UR STRIP-MCNC: NORMAL MG/DL
VENTRICULAR RATE- MUSE: 85 BPM
WBC # BLD AUTO: 9.1 10E3/UL (ref 4–11)
WBC URINE: <1 /HPF

## 2023-02-20 PROCEDURE — 99285 EMERGENCY DEPT VISIT HI MDM: CPT | Mod: 25,CS

## 2023-02-20 PROCEDURE — 87637 SARSCOV2&INF A&B&RSV AMP PRB: CPT | Performed by: EMERGENCY MEDICINE

## 2023-02-20 PROCEDURE — 84484 ASSAY OF TROPONIN QUANT: CPT | Performed by: EMERGENCY MEDICINE

## 2023-02-20 PROCEDURE — 85025 COMPLETE CBC W/AUTO DIFF WBC: CPT | Performed by: EMERGENCY MEDICINE

## 2023-02-20 PROCEDURE — 83735 ASSAY OF MAGNESIUM: CPT | Performed by: EMERGENCY MEDICINE

## 2023-02-20 PROCEDURE — 70450 CT HEAD/BRAIN W/O DYE: CPT

## 2023-02-20 PROCEDURE — 80048 BASIC METABOLIC PNL TOTAL CA: CPT | Performed by: EMERGENCY MEDICINE

## 2023-02-20 PROCEDURE — 84484 ASSAY OF TROPONIN QUANT: CPT | Mod: 91 | Performed by: EMERGENCY MEDICINE

## 2023-02-20 PROCEDURE — 258N000003 HC RX IP 258 OP 636: Performed by: EMERGENCY MEDICINE

## 2023-02-20 PROCEDURE — 93005 ELECTROCARDIOGRAM TRACING: CPT

## 2023-02-20 PROCEDURE — 36415 COLL VENOUS BLD VENIPUNCTURE: CPT | Performed by: EMERGENCY MEDICINE

## 2023-02-20 PROCEDURE — 81001 URINALYSIS AUTO W/SCOPE: CPT | Performed by: EMERGENCY MEDICINE

## 2023-02-20 PROCEDURE — C9803 HOPD COVID-19 SPEC COLLECT: HCPCS

## 2023-02-20 PROCEDURE — 250N000013 HC RX MED GY IP 250 OP 250 PS 637: Performed by: EMERGENCY MEDICINE

## 2023-02-20 RX ORDER — MECLIZINE HYDROCHLORIDE 25 MG/1
25 TABLET ORAL ONCE
Status: COMPLETED | OUTPATIENT
Start: 2023-02-20 | End: 2023-02-20

## 2023-02-20 RX ADMIN — SODIUM CHLORIDE 1000 ML: 9 INJECTION, SOLUTION INTRAVENOUS at 05:50

## 2023-02-20 RX ADMIN — MECLIZINE HYDROCHLORIDE 25 MG: 25 TABLET ORAL at 04:12

## 2023-02-20 ASSESSMENT — ENCOUNTER SYMPTOMS
SHORTNESS OF BREATH: 0
HEMATURIA: 0
RHINORRHEA: 1
DIFFICULTY URINATING: 0
DIARRHEA: 0
DIZZINESS: 1
VOMITING: 0
CONSTIPATION: 0
ABDOMINAL PAIN: 0
FREQUENCY: 0
DYSURIA: 0

## 2023-02-20 ASSESSMENT — ACTIVITIES OF DAILY LIVING (ADL): ADLS_ACUITY_SCORE: 35

## 2023-02-20 NOTE — ED PROVIDER NOTES
"  History     Chief Complaint:  Dizziness       HPI   Marco Hendricks is a 83 year old male with a history of hypertension, hyperlipidemia who presents with dizziness he describes using \"spinning\" symptoms of vertigo. This onset around 3.5 hours ago, and it was not accompanied by ataxia; he is feeling improved upon evaluation. He did have some dry heaves for about 15 minutes, but he did not have any vomiting. He incidentally complains of having a cold with some congestion and rhinorrhea as well as pain in his right ear; he notes he has recently had decrease in his hearing. No abnormal bowel movements, abnormal micturition, chest pain, shortness of breath, vomiting, or abdominal pain.       Independent Historian:   None - Patient Only    Review of External Notes: care everywhere and prior ED visits.      ROS:  Review of Systems   HENT: Positive for congestion, ear pain, hearing loss and rhinorrhea.    Respiratory: Negative for shortness of breath.    Cardiovascular: Negative for chest pain.   Gastrointestinal: Negative for abdominal pain, constipation, diarrhea and vomiting.   Genitourinary: Negative for decreased urine volume, difficulty urinating, dysuria, enuresis, frequency and hematuria.   Neurological: Positive for dizziness.   All other systems reviewed and are negative.      Allergies:  No Known Allergies     Medications:    Asprin 81 mg   Atorvastatin calcium  Celecoxib   Fexofenadine   Losartan   Oxycodone   Senna-docusate     Past Medical History:    Adenomatous polyp of colon   Calculus of kidney   Cataract   Coronary atherosclerosis of native coronary artery   Hypertension   Hard of hearing   History of basal cell carcinoma   Malignant neoplasm of prostate   Migraine   Hyperlipidemia   Osteoarthritis   Seasonal allergic rhinitis   Dysplastic nevus  Malignant neoplasm of prostate  Hearing loss  Ingrown nail    Past Surgical History:    Cataract removal, bilateral   Hip arthoplasty  Hip arthoplasty, " revision      Family History:    Mother: Cataract, diabetes, macular degeneration    Social History:  Patient presents to the ED with family member.   PCP: Xiang Jaffe     Physical Exam     Patient Vitals for the past 24 hrs:   BP Temp Temp src Pulse Resp SpO2   02/20/23 0818 -- -- -- -- -- 94 %   02/20/23 0803 -- -- -- -- -- 93 %   02/20/23 0748 -- -- -- -- -- 95 %   02/20/23 0733 (!) 153/96 -- -- 85 -- 97 %   02/20/23 0633 (!) 165/94 -- -- 91 17 (!) 87 %   02/20/23 0618 (!) 172/85 -- -- 81 19 97 %   02/20/23 0603 -- -- -- 78 10 95 %   02/20/23 0600 (!) 144/85 -- -- 76 11 93 %   02/20/23 0548 (!) 159/87 -- -- 74 11 91 %   02/20/23 0533 -- -- -- 82 28 93 %   02/20/23 0528 (!) 159/89 -- -- 80 12 92 %   02/20/23 0401 131/80 (!) 96.4  F (35.8  C) Temporal 85 20 95 %        Physical Exam  Constitutional: Patient is well appearing. No distress.  Head: Atraumatic.  Mouth/Throat:  No oropharyngeal exudate.  Ears: normal   Eyes: Conjunctivae and EOM are normal. No scleral icterus.  Neck: Normal range of motion. Neck supple.   Cardiovascular: Normal rate, regular rhythm, normal heart sounds and intact distal perfusion.   Pulmonary/Chest: Breath sounds normal. No respiratory distress.  Abdominal: Soft. Bowel sounds are normal. No distension. No tenderness. No rebound or guarding.   Musculoskeletal: Normal range of motion. No edema or tenderness.   Neurological: Alert and orientated to person, place, and time. No observable focal neuro deficit.  Ambulatory without any sx.  Skin: Warm and dry. No rash noted. Not diaphoretic.       Emergency Department Course     ECG results from 02/20/23   EKG 12 lead     Value    Systolic Blood Pressure     Diastolic Blood Pressure     Ventricular Rate 85    Atrial Rate 85    AZ Interval 174    QRS Duration 96        QTc 428    P Axis 12    R AXIS 53    T Axis 32    Interpretation ECG      Sinus rhythm with occasional Premature ventricular complexes  Otherwise normal ECG  No previous  ECGs available  Confirmed by - EMERGENCY ROOM, PHYSICIAN ([id number]),  SHERRI RAM (Martita) on 2/20/2023 6:53:50 AM         Imaging:  CT Head w/o Contrast   Final Result   IMPRESSION:   1.  No CT evidence of acute intracranial hemorrhage, mass or recent transcortical infarct.   2.  Mild volume loss and presumed chronic small vessel ischemic changes.   3.  Possible air-fluid level in the right maxillary sinus which is incompletely demonstrated. Correlate for acute sinusitis.         Report per radiology    Laboratory:  Labs Ordered and Resulted from Time of ED Arrival to Time of ED Departure   BASIC METABOLIC PANEL - Abnormal       Result Value    Sodium 141      Potassium 4.7      Chloride 104      Carbon Dioxide (CO2) 25      Anion Gap 12      Urea Nitrogen 15.9      Creatinine 1.06      Calcium 9.3      Glucose 121 (*)     GFR Estimate 71     TROPONIN T, HIGH SENSITIVITY - Abnormal    Troponin T, High Sensitivity 29 (*)    INFLUENZA A/B & SARS-COV2 PCR MULTIPLEX - Abnormal    Influenza A PCR Negative      Influenza B PCR Negative      RSV PCR Positive (*)     SARS CoV2 PCR Negative     ROUTINE UA WITH MICROSCOPIC REFLEX TO CULTURE - Abnormal    Color Urine Light Yellow      Appearance Urine Clear      Glucose Urine Negative      Bilirubin Urine Negative      Ketones Urine Negative      Specific Gravity Urine 1.010      Blood Urine Negative      pH Urine 6.5      Protein Albumin Urine Negative      Urobilinogen Urine Normal      Nitrite Urine Negative      Leukocyte Esterase Urine Negative      Mucus Urine Present (*)     RBC Urine <1      WBC Urine <1     MAGNESIUM - Normal    Magnesium 2.0     TROPONIN T, HIGH SENSITIVITY - Normal    Troponin T, High Sensitivity 19     CBC WITH PLATELETS AND DIFFERENTIAL    WBC Count 9.1      RBC Count 4.58      Hemoglobin 13.4      Hematocrit 42.3      MCV 92      MCH 29.3      MCHC 31.7      RDW 13.0      Platelet Count 207      % Neutrophils 75      % Lymphocytes 13      %  Monocytes 9      % Eosinophils 2      % Basophils 0      % Immature Granulocytes 1      NRBCs per 100 WBC 0      Absolute Neutrophils 6.8      Absolute Lymphocytes 1.2      Absolute Monocytes 0.8      Absolute Eosinophils 0.2      Absolute Basophils 0.0      Absolute Immature Granulocytes 0.1      Absolute NRBCs 0.0          Emergency Department Course & Assessments:  Interventions:  Medications   meclizine (ANTIVERT) tablet 25 mg (25 mg Oral Given 2/20/23 4501)   0.9% sodium chloride BOLUS (0 mLs Intravenous Stopped 2/20/23 0437)      Independent Interpretation (X-rays, CTs, rhythm strip):  I independently reviewed imaging and agree with radiologist interpretation.     Consultations/Discussion of Management or Tests:  None       Social Determinants of Health affecting care:   None    Assessments:  1830 I obtained the history and examined the patient as noted above.   0805 I rechecked the patient and explained findings.     Disposition:  The patient was discharged to home.     Impression & Plan      Medical Decision Making:  Marco Hendricks is a 83 year old male who presents for evaluation of cough, fever and myalgias sinus congestion and episode of dizziness in middle of night now completely resolved.  This is consistent with an viral syndrome.  Patient is RSV positive.  Not vertigo at this point.  TM normal.  Neuro exam normal.  CT head reassuring as is other organ systems for any end organ damage. They are at risk for pneumonia but no signs of this are detected on today's visit.  Close followup of primary care physician is indicated and return to the ED for high fevers > 103 for more than 48 hours more, increasing productive cough, shortness of breath, or confusion.  There is no signs of serious bacterial infection such as bacteremia, meningitis, UTI/pyelonephritis, strep pharyngitis, etc.        Diagnosis:    ICD-10-CM    1. Dizziness  R42       2. RSV infection  B33.8            Discharge  Medications:  Discharge Medication List as of 2/20/2023  8:20 AM             Scribe Disclosure:  I, Joshua Kjer, am serving as a scribe at 6:03 AM on 2/20/2023 to document services personally performed by Mark Cabello MD based on my observations and the provider's statements to me.   2/20/2023   Mark Cabello MD Stevens, Andrew C, MD  02/20/23 1340

## 2023-02-20 NOTE — ED TRIAGE NOTES
Pt to ER with c/o dizziness pt states room spinning with nausea no hx of vertigo, has had recent URI

## 2023-02-20 NOTE — ED NOTES
"PIT/Triage Evaluation    Patient presented with dizziness.  The patient states that at 3:00 he woke up, sat up, pivoted to his left to get out of bed, and then noticed acute onset dizziness/spinning sensation.  He denies palpitations.  He states he went to the bathroom and had to hang onto counters to get there and then sat on the toilet.  He states he was able to then make it back to his bedroom but still felt quite dizzy and began having dry heaves.  He then says he made it to the chair in his living room but still did not feel right so he called his daughter and son and he was then brought to the emergency department.    He states that in triage she got a \"pill\" (meclizine) and by the time of my evaluation at about 520, his dizziness had almost completely resolved.    The patient states he has had a \"cold\" for 1 week specifically noting head congestion and nasal congestion along with rhinorrhea.  He also feels that his ears have been plugged.  He notes that he has difficulty with hearing at baseline but even with his hearing aids it seemed to be decreased further on the right.    Exam is notable for:    HENT:    Head: No external signs of trauma noted.   Eyes: Pupils are equal, round, and reactive to light.    Ears: Left tympanic membrane is mostly obscured by wax but visualized portions appear normal.  Left external canal are otherwise normal.  Right tympanic membrane does have slight erythema with a little bit of bulging.  No fluid layering noted.  Right external canal is otherwise normal.  Cardiovascular: Normal rate, regular rhythm, normal heart sounds and intact distal pulses.    Pulmonary/Chest: Effort normal and breath sounds normal. No respiratory distress. No wheezes noted. Neurological:    Patient is alert and oriented to person, place, and time.    Speech is fluent, cognition is normal.   CN 2-12 intact (PERRL, EOMI, symmetric smile, equal eye squeeze and forehead raise, normal and equal sensation to " bilateral forehead/cheek/chin, grossly equal hearing B/L, midline tongue protrusion with nl side-to-side movement, normal shoulder shrug).    RUE strength 5/5: , finger abd, wrist flex/ext, elbow flex/ext.    LUE strength 5/5: , finger abd, wrist flex/ext, elbow flex/ext.    RLE strength 5/5: ankle flex/ext, knee flex/ext, hip flex.    LLE strength 5/5: ankle flex/ext, knee flex/ext, hip flex.    Sensation equal in all 4 extremities.    No arm drift.     Cerebellar: Normal rapid alternating movements     ( finger-nose-finger, rapid pronation/supination, hand rolling)    Normal heel-to-shin     Appropriate interventions for symptom management were initiated if applicable.  Appropriate diagnostic tests were initiated if indicated.    ECG  ECG taken at 0517, ECG read at 0517  Sinus rhythm with occasional PVCs  Otherwise normal ECG    When compared with prior ECG dated 11/29/2022, PVCs are new but otherwise there is no significant change.   Rate 85 bpm. MO interval 174 ms. QRS duration 96 ms. QT/QTc 360/428 ms. P-R-T axes 12 53 32.         Important information for subsequent clinician:    The patient's neurological exam is reassuring.  Unable to utilize HINTS exam for risk stratification as the patient has no nystagmus.    His improvement with meclizine is reassuring and I doubt cardiac source of his symptoms.        I briefly evaluated the patient and developed an initial plan of care. I discussed this plan and explained that this brief interaction does not constitute a full evaluation. Patient/family understands that they should wait to be fully evaluated and discuss any test results with another clinician prior to leaving the hospital.       Angel Powell, DO  02/20/23 0764       Angel Powell, DO  02/20/23 5997

## 2023-03-04 ENCOUNTER — HOSPITAL ENCOUNTER (EMERGENCY)
Facility: CLINIC | Age: 83
Discharge: HOME | End: 2023-03-04
Attending: PHYSICIAN ASSISTANT | Admitting: PHYSICIAN ASSISTANT
Payer: COMMERCIAL

## 2023-03-04 ENCOUNTER — APPOINTMENT (OUTPATIENT)
Dept: CT IMAGING | Facility: CLINIC | Age: 83
End: 2023-03-04
Attending: PHYSICIAN ASSISTANT
Payer: COMMERCIAL

## 2023-03-04 VITALS
SYSTOLIC BLOOD PRESSURE: 140 MMHG | DIASTOLIC BLOOD PRESSURE: 83 MMHG | WEIGHT: 192.02 LBS | RESPIRATION RATE: 20 BRPM | TEMPERATURE: 97.4 F | BODY MASS INDEX: 28.36 KG/M2 | HEART RATE: 74 BPM | OXYGEN SATURATION: 98 %

## 2023-03-04 DIAGNOSIS — R07.9 CHEST PAIN: ICD-10-CM

## 2023-03-04 DIAGNOSIS — I71.21 ANEURYSM OF ASCENDING AORTA WITHOUT RUPTURE (H): ICD-10-CM

## 2023-03-04 LAB
ALBUMIN SERPL BCG-MCNC: 4.6 G/DL (ref 3.5–5.2)
ALP SERPL-CCNC: 71 U/L (ref 40–129)
ALT SERPL W P-5'-P-CCNC: 23 U/L (ref 10–50)
ANION GAP SERPL CALCULATED.3IONS-SCNC: 10 MMOL/L (ref 7–15)
AST SERPL W P-5'-P-CCNC: 33 U/L (ref 10–50)
BASOPHILS # BLD AUTO: 0.1 10E3/UL (ref 0–0.2)
BASOPHILS NFR BLD AUTO: 1 %
BILIRUB SERPL-MCNC: 0.8 MG/DL
BUN SERPL-MCNC: 17.8 MG/DL (ref 8–23)
CALCIUM SERPL-MCNC: 9.7 MG/DL (ref 8.8–10.2)
CHLORIDE SERPL-SCNC: 104 MMOL/L (ref 98–107)
CREAT SERPL-MCNC: 1.1 MG/DL (ref 0.67–1.17)
D DIMER PPP FEU-MCNC: 0.91 UG/ML FEU (ref 0–0.5)
DEPRECATED HCO3 PLAS-SCNC: 25 MMOL/L (ref 22–29)
EOSINOPHIL # BLD AUTO: 0.2 10E3/UL (ref 0–0.7)
EOSINOPHIL NFR BLD AUTO: 3 %
ERYTHROCYTE [DISTWIDTH] IN BLOOD BY AUTOMATED COUNT: 13.1 % (ref 10–15)
GFR SERPL CREATININE-BSD FRML MDRD: 67 ML/MIN/1.73M2
GLUCOSE SERPL-MCNC: 96 MG/DL (ref 70–99)
HCT VFR BLD AUTO: 43.6 % (ref 40–53)
HGB BLD-MCNC: 13.8 G/DL (ref 13.3–17.7)
IMM GRANULOCYTES # BLD: 0.1 10E3/UL
IMM GRANULOCYTES NFR BLD: 1 %
LYMPHOCYTES # BLD AUTO: 1.2 10E3/UL (ref 0.8–5.3)
LYMPHOCYTES NFR BLD AUTO: 16 %
MCH RBC QN AUTO: 29.1 PG (ref 26.5–33)
MCHC RBC AUTO-ENTMCNC: 31.7 G/DL (ref 31.5–36.5)
MCV RBC AUTO: 92 FL (ref 78–100)
MONOCYTES # BLD AUTO: 0.8 10E3/UL (ref 0–1.3)
MONOCYTES NFR BLD AUTO: 11 %
NEUTROPHILS # BLD AUTO: 5 10E3/UL (ref 1.6–8.3)
NEUTROPHILS NFR BLD AUTO: 68 %
NRBC # BLD AUTO: 0 10E3/UL
NRBC BLD AUTO-RTO: 0 /100
PLATELET # BLD AUTO: 226 10E3/UL (ref 150–450)
POTASSIUM SERPL-SCNC: 4.5 MMOL/L (ref 3.4–5.3)
PROT SERPL-MCNC: 7.3 G/DL (ref 6.4–8.3)
RBC # BLD AUTO: 4.74 10E6/UL (ref 4.4–5.9)
SODIUM SERPL-SCNC: 139 MMOL/L (ref 136–145)
TROPONIN T SERPL HS-MCNC: 16 NG/L
TROPONIN T SERPL HS-MCNC: 19 NG/L
WBC # BLD AUTO: 7.3 10E3/UL (ref 4–11)

## 2023-03-04 PROCEDURE — 250N000011 HC RX IP 250 OP 636: Performed by: PHYSICIAN ASSISTANT

## 2023-03-04 PROCEDURE — 84484 ASSAY OF TROPONIN QUANT: CPT | Performed by: PHYSICIAN ASSISTANT

## 2023-03-04 PROCEDURE — 85014 HEMATOCRIT: CPT | Performed by: PHYSICIAN ASSISTANT

## 2023-03-04 PROCEDURE — C9803 HOPD COVID-19 SPEC COLLECT: HCPCS

## 2023-03-04 PROCEDURE — 250N000009 HC RX 250: Performed by: PHYSICIAN ASSISTANT

## 2023-03-04 PROCEDURE — 71275 CT ANGIOGRAPHY CHEST: CPT

## 2023-03-04 PROCEDURE — 99285 EMERGENCY DEPT VISIT HI MDM: CPT | Mod: 25

## 2023-03-04 PROCEDURE — 80053 COMPREHEN METABOLIC PANEL: CPT | Performed by: PHYSICIAN ASSISTANT

## 2023-03-04 PROCEDURE — 36415 COLL VENOUS BLD VENIPUNCTURE: CPT | Performed by: PHYSICIAN ASSISTANT

## 2023-03-04 PROCEDURE — 93005 ELECTROCARDIOGRAM TRACING: CPT | Mod: RTG

## 2023-03-04 PROCEDURE — 85379 FIBRIN DEGRADATION QUANT: CPT | Performed by: PHYSICIAN ASSISTANT

## 2023-03-04 RX ORDER — IOPAMIDOL 755 MG/ML
500 INJECTION, SOLUTION INTRAVASCULAR ONCE
Status: COMPLETED | OUTPATIENT
Start: 2023-03-04 | End: 2023-03-04

## 2023-03-04 RX ADMIN — IOPAMIDOL 63 ML: 755 INJECTION, SOLUTION INTRAVENOUS at 11:09

## 2023-03-04 RX ADMIN — SODIUM CHLORIDE 85 ML: 9 INJECTION, SOLUTION INTRAVENOUS at 11:09

## 2023-03-04 ASSESSMENT — ACTIVITIES OF DAILY LIVING (ADL)
ADLS_ACUITY_SCORE: 35
ADLS_ACUITY_SCORE: 35

## 2023-03-04 NOTE — ED PROVIDER NOTES
History     Chief Complaint:  Chest Pain       HPI   Marco Hendricks is a 83 year old male, recently diagnosed with RSV on 2/20/23, who presents to the ED for evaluation of chest pain.  Patient reports waking up sometime between 3879-1466 with an episode of sharp chest discomfort to his left chest.  He believes that his pain did alleviate, however he got up around 0625 again with chest pain.  He describes this pain as like a pressure.  He has been constant since getting up for the second time.  It is nonradiating.  No alleviating or aggravating factors.  Patient notes ongoing cough, however states that this has been improving.  He denies current fevers, chills, dyspnea, leg swelling, abdominal pain, nausea, vomiting, diarrhea, hemoptysis.  No history of blood clots.  He is not on blood thinners.  Of note, patient states that he has been seeing PT for positional vertigo (the reason that he was seen in the ED on 2/20/23) and this has been improving.    Independent Historian:   None - Patient Only    Review of External Notes:  Urgent care visit today:  Marco Hendricks is a 83 y.o.male presents to the Urgent Care for Chest Pain  Patient was woken up at 0300, patient reports sharp pain from 3590-2594, dull ache.   Denies hx of heart issues    ROS:  Review of Systems   All other systems reviewed and are negative.  ROS neg other than the symptoms noted above in the HPI.    Allergies:  No Known Allergies     Medications:    aspirin 81  Atorvastatin   celecoxib  fexofenadine   losartan   oxycodone   senna-docusate     Past Medical History:    Adenomatous polyp of colon  Calculus of kidney   Cataract  Hypertension  Hard of hearing  Coronary atherosclerosis   Basal cell carcinoma   Malignant neoplasm of prostate   Migraine   Hyperlipidemia  Osteoarthritis  Allergic rhinitis     Past Surgical History:    Left hip arthroplasty   Cataract removal     Social History:  The patient presents to the ED with son.  PCP: dL  Xiang TORRES     Physical Exam     Patient Vitals for the past 24 hrs:   BP Temp Temp src Pulse Resp SpO2 Weight   03/04/23 1313 -- -- -- -- -- 98 % --   03/04/23 1258 (!) 140/83 -- -- 74 -- -- --   03/04/23 1054 -- -- -- 73 -- 94 % --   03/04/23 1047 121/82 -- -- 73 -- 92 % --   03/04/23 1046 121/82 -- -- 73 -- 93 % --   03/04/23 1032 122/87 -- -- 72 -- 94 % --   03/04/23 1017 (!) 146/89 -- -- 77 -- 96 % --   03/04/23 1010 -- -- -- 79 -- -- --   03/04/23 1002 138/87 -- -- 74 -- 96 % --   03/04/23 0921 (!) 157/105 -- -- -- -- -- 87.1 kg (192 lb 0.3 oz)   03/04/23 0920 -- 97.4  F (36.3  C) Temporal 85 20 98 % --        Physical Exam  Constitutional: Pleasant. Cooperative.   Eyes: Pupils equally round and reactive  HENT: Head is normal in appearance. Oropharynx is normal with moist mucus membranes.  Cardiovascular: Regular rate and rhythm and without murmurs.  Respiratory: Normal respiratory effort, lungs are clear bilaterally.  GI: Abdomen is soft, non-tender, non-distended. No guarding, rebound, or rigidity.  Musculoskeletal: No asymmetry of the lower extremities, no tenderness to palpation.   Skin: Normal, without rash.  Neurologic: Cranial nerves grossly intact, normal cognition, no focal deficits. Alert and oriented x 3.   Psychiatric: Normal affect.  Nursing notes and vital signs reviewed.      Emergency Department Course     ECG results from 03/04/23   EKG 12 lead     Value    Systolic Blood Pressure     Diastolic Blood Pressure     Ventricular Rate 76    Atrial Rate 76    OR Interval 176    QRS Duration 94        QTc 414    P Axis 53    R AXIS 50    T Axis 37    Interpretation ECG      Sinus rhythm with occasional Premature ventricular complexes  Otherwise normal ECG  When compared with ECG of 20-FEB-2023 05:17,  No significant change was found         Imaging:  CT Chest Pulmonary Embolism w Contrast   Final Result   IMPRESSION:   1.  No evidence of pulmonary embolism.   2.  Aneurysmal dilatation of the  ascending thoracic aorta measuring 4.7 cm in diameter.   3.  Moderate atherosclerotic vascular calcification of the coronary arteries.               Report per radiology    Laboratory:  Labs Ordered and Resulted from Time of ED Arrival to Time of ED Departure   D DIMER QUANTITATIVE - Abnormal       Result Value    D-Dimer Quantitative 0.91 (*)    COMPREHENSIVE METABOLIC PANEL - Normal    Sodium 139      Potassium 4.5      Chloride 104      Carbon Dioxide (CO2) 25      Anion Gap 10      Urea Nitrogen 17.8      Creatinine 1.10      Calcium 9.7      Glucose 96      Alkaline Phosphatase 71      AST 33      ALT 23      Protein Total 7.3      Albumin 4.6      Bilirubin Total 0.8      GFR Estimate 67     TROPONIN T, HIGH SENSITIVITY - Normal    Troponin T, High Sensitivity 16     TROPONIN T, HIGH SENSITIVITY - Normal    Troponin T, High Sensitivity 19     CBC WITH PLATELETS AND DIFFERENTIAL    WBC Count 7.3      RBC Count 4.74      Hemoglobin 13.8      Hematocrit 43.6      MCV 92      MCH 29.1      MCHC 31.7      RDW 13.1      Platelet Count 226      % Neutrophils 68      % Lymphocytes 16      % Monocytes 11      % Eosinophils 3      % Basophils 1      % Immature Granulocytes 1      NRBCs per 100 WBC 0      Absolute Neutrophils 5.0      Absolute Lymphocytes 1.2      Absolute Monocytes 0.8      Absolute Eosinophils 0.2      Absolute Basophils 0.1      Absolute Immature Granulocytes 0.1      Absolute NRBCs 0.0          Emergency Department Course & Assessments:  Interventions:  Medications   CT Scan Flush (85 mLs Intravenous $Given 3/4/23 1109)   iopamidol (ISOVUE-370) solution 500 mL (63 mLs Intravenous $Given 3/4/23 1109)        Assessments:  1019 I obtained history and examined patient as noted above.   1203 I reassessed patient and discussed plan of care. I recommended hospital admission, though patient voices strong preference for returning home. Will perform a repeat troponin.   1312 I reassessed  patient.    Independent Interpretation (X-rays, CTs, rhythm strip):  None    Consultations/Discussion of Management or Tests:  None     Social Determinants of Health affecting care:   None    Disposition:  The patient was discharged to home.     Impression & Plan        HEART Score  Background  Calculates the overall risk of adverse event in patient's presenting with chest pain.  Based on 5 criteria (each assigned 0-2 points) including suspiciousness of history, EKG, age, risk factors and troponin.    Data  83 year old male  has Essential hypertension; Hard of hearing; Seasonal allergic rhinitis; Mixed hyperlipidemia; Osteoarthritis; History of basal cell carcinoma; Calculus of kidney; Migraine without aura and without status migrainosus, not intractable; Malignant neoplasm of prostate (H); Coronary atherosclerosis of native coronary artery; Cataract; Screening for colon cancer; History of dysplastic nevus; Rectal bleeding; Adenomatous polyp of colon; and Status post revision of total hip replacement on their problem list.   has no history on file for tobacco use.  family history is not on file.  No results found for: TROPI  Criteria   0-2 points for each of 5 items (maximum of 10 points):  Score 1- History moderately suspicious for coronary syndrome  Score 0- EKG Normal  Score 2- Age 65 years or older  Score 1- One to 2 risk factors for atherosclerotic disease  Score 0- Within normal limits for troponin levels  Interpretation  Risk of adverse outcome  Heart Score: 4  Total Score 4-6- Adverse Outcome Risk 20.3% - Supports admission with standard rule-out management -serial troponins and stress testing    Medical Decision Making:  Marco Hendricks is a 83 year old male who presents to the ED for evaluation of chest pain.  See HPI as above for additional details.  Vitals and physical exam as above.  Differentials broad include ACS, dissection, PE, pneumothorax, GERD, MSK, pneumonia, pericarditis, amongst others.   Work-up obtained as above.  Initial troponin returns negative.  CT without any PE, pneumothorax, pneumonia, mass.  Did discuss with patient evidence for a sending thoracic aneurysm.  Per diameter, no indication for emergent intervention at this time, however will place referral for vascular surgery for patient given size.  Overall, discussed with patient that he met criteria for admission for further cardiac work-up.  With shared decision making, patient would prefer discharged home with close outpatient follow-up.  He will call his PCP ASAP to discuss need for further work-up including possibly exercise stress test.  Discussed with patient in the setting of unclear etiology of his symptoms need for very close return precautions should symptoms recur or worsen. All questions answered. Patient discharged to home in stable condition.    Diagnosis:    ICD-10-CM    1. Chest pain  R07.9       2. Aneurysm of ascending aorta without rupture  I71.21 Vascular Medicine Referral            Scribe Disclosure:  Tori CONDE, am serving as a scribe at 12:23 PM on 3/4/2023 to document services personally performed by Nicholas Samuels PA-C based on my observations and the provider's statements to me.   3/4/2023   Nicholas Samuels PA-C     This record was created at least in part using electronic voice recognition software, so please excuse any typographical errors.       Nicholas Samuels PA-C  03/04/23 8426

## 2023-03-04 NOTE — ED TRIAGE NOTES
pt comes in with left sided chest pain, worse overnight. Pt recently dx with RSV on 2/20. Pt continues with a cough, no fever or SOB.

## 2023-03-04 NOTE — DISCHARGE INSTRUCTIONS
The cause of your symptoms is unclear at this time. We discuss option for admission for further cardiac work up; at this time you will pursue further work up as an outpatient. Call your primary doctor to discuss this ASAP. With any worsening, return immediately for further evaluation.

## 2023-03-06 LAB
ATRIAL RATE - MUSE: 76 BPM
DIASTOLIC BLOOD PRESSURE - MUSE: NORMAL MMHG
INTERPRETATION ECG - MUSE: NORMAL
P AXIS - MUSE: 53 DEGREES
PR INTERVAL - MUSE: 176 MS
QRS DURATION - MUSE: 94 MS
QT - MUSE: 368 MS
QTC - MUSE: 414 MS
R AXIS - MUSE: 50 DEGREES
SYSTOLIC BLOOD PRESSURE - MUSE: NORMAL MMHG
T AXIS - MUSE: 37 DEGREES
VENTRICULAR RATE- MUSE: 76 BPM

## 2023-03-13 ENCOUNTER — TELEPHONE (OUTPATIENT)
Dept: OTHER | Facility: CLINIC | Age: 83
End: 2023-03-13
Payer: COMMERCIAL

## 2023-03-13 NOTE — TELEPHONE ENCOUNTER
Patient states that he was advised by his PCP, Dr Xiang Jaffe on Friday that he doesn't need any additional assistance with this concern. Patient states that his next OV with Dr Jaffe is on 4/4. Please advise next steps.

## 2023-03-13 NOTE — TELEPHONE ENCOUNTER
Pt declined scheduling recommended referral.  Appt hold time removed from schedule.  Radha Cornelius, BSN, RN-[facility]

## 2023-03-13 NOTE — TELEPHONE ENCOUNTER
Per Vascular Clinic Scheduling Guidelines, referral order transferred to [facility] Heart Cardiology orders on 3/6/23.  This was ultimately transferred back to [facility] on 3/13/23.    Pt referred to [facility] by Nicholas Samuels PA-C for ascending aortic aneurysm.    Pt needs to be scheduled for consult with Dr. Long or Dr. Sanchez.  Will route to scheduling to coordinate an appointment on 3/17/23.  Time is currently held on 3/17/23 at  to use 60 minute telemedicine spots as a 60 minute IN PERSON CONSULT.  Please remove hold if patient is not able to make this appointment.    Appt note: Ref by Nicholas Samuels PA-C for ascending aortic aneurysm; notes and imaging in Epic.    Radha Cornelius, BSN, RN-[facility]

## 2023-04-24 RX ORDER — AMOXICILLIN 500 MG/1
4 CAPSULE ORAL
COMMUNITY

## 2023-04-24 NOTE — PROGRESS NOTES
PTA medications updated by Medication Scribe prior to surgery via phone call with patient (last doses completed by Nurse)     Medication history sources: Patient, Surescripts and H&P  In the past week, patient estimated taking medication this percent of the time: Greater than 90%      Significant changes made to the medication list:  None      Additional medication history information:   None    Medication reconciliation completed by provider prior to medication history? No    Time spent in this activity: 35 MINUTES    The information provided in this note is only as accurate as the sources available at the time of update(s)      Prior to Admission medications    Medication Sig Last Dose Taking? Auth Provider Long Term End Date   amoxicillin (AMOXIL) 500 MG capsule Take 4 capsules by mouth once as needed (1 HOUR PRIOR TO DENTAL VISIT) (500 MG X 4 = 2,000 MG)  at PRN Yes Reported, Patient     aspirin (ASA) 81 MG EC tablet Take 1 tablet (81 mg) by mouth daily Resume usual dose of aspirin after finishing increased dose prescribed after surgery  Yes Roxanne Long PA-C     atorvastatin (LIPITOR) 20 MG tablet Take 20 mg by mouth every evening  at PM Yes Reported, Patient Yes    calcium carbonate (OS- MG [place]. [place]) 500 MG tablet Take 500 mg by mouth daily  Yes Reported, Patient     fexofenadine (ALLEGRA) 180 MG tablet Take 180 mg by mouth every morning  Yes Reported, Patient     losartan (COZAAR) 100 MG tablet Take 100 mg by mouth daily  at PM Yes Reported, Patient Yes    Omega-3 Fatty Acids (OMEGA-3 FISH OIL) 1200 MG CAPS Take 3 capsules by mouth daily  Yes Reported, Patient

## 2023-04-25 ENCOUNTER — APPOINTMENT (OUTPATIENT)
Dept: PHYSICAL THERAPY | Facility: CLINIC | Age: 83
End: 2023-04-25
Attending: ORTHOPAEDIC SURGERY
Payer: COMMERCIAL

## 2023-04-25 ENCOUNTER — ANESTHESIA (OUTPATIENT)
Dept: SURGERY | Facility: CLINIC | Age: 83
End: 2023-04-25
Payer: COMMERCIAL

## 2023-04-25 ENCOUNTER — ANESTHESIA EVENT (OUTPATIENT)
Dept: SURGERY | Facility: CLINIC | Age: 83
End: 2023-04-25
Payer: COMMERCIAL

## 2023-04-25 ENCOUNTER — APPOINTMENT (OUTPATIENT)
Dept: GENERAL RADIOLOGY | Facility: CLINIC | Age: 83
End: 2023-04-25
Attending: PHYSICIAN ASSISTANT
Payer: COMMERCIAL

## 2023-04-25 ENCOUNTER — HOSPITAL ENCOUNTER (OUTPATIENT)
Facility: CLINIC | Age: 83
Discharge: HOME | End: 2023-04-26
Attending: ORTHOPAEDIC SURGERY | Admitting: ORTHOPAEDIC SURGERY
Payer: COMMERCIAL

## 2023-04-25 DIAGNOSIS — Z98.890 POST-OPERATIVE STATE: Primary | ICD-10-CM

## 2023-04-25 PROBLEM — Z96.659 S/P TOTAL KNEE ARTHROPLASTY: Status: ACTIVE | Noted: 2023-04-25

## 2023-04-25 LAB
CREAT SERPL-MCNC: 1.03 MG/DL (ref 0.67–1.17)
CREAT SERPL-MCNC: 1.06 MG/DL (ref 0.67–1.17)
FASTING STATUS PATIENT QL REPORTED: YES
GFR SERPL CREATININE-BSD FRML MDRD: 71 ML/MIN/1.73M2
GFR SERPL CREATININE-BSD FRML MDRD: 73 ML/MIN/1.73M2
GLUCOSE SERPL-MCNC: 103 MG/DL (ref 70–99)
POTASSIUM SERPL-SCNC: 4.1 MMOL/L (ref 3.4–5.3)

## 2023-04-25 PROCEDURE — 250N000011 HC RX IP 250 OP 636: Performed by: PHYSICIAN ASSISTANT

## 2023-04-25 PROCEDURE — 250N000009 HC RX 250: Performed by: ANESTHESIOLOGY

## 2023-04-25 PROCEDURE — 97116 GAIT TRAINING THERAPY: CPT | Mod: GP

## 2023-04-25 PROCEDURE — 97161 PT EVAL LOW COMPLEX 20 MIN: CPT | Mod: GP

## 2023-04-25 PROCEDURE — 370N000017 HC ANESTHESIA TECHNICAL FEE, PER MIN: Performed by: ORTHOPAEDIC SURGERY

## 2023-04-25 PROCEDURE — 250N000009 HC RX 250: Performed by: ORTHOPAEDIC SURGERY

## 2023-04-25 PROCEDURE — 250N000009 HC RX 250: Performed by: NURSE ANESTHETIST, CERTIFIED REGISTERED

## 2023-04-25 PROCEDURE — 36415 COLL VENOUS BLD VENIPUNCTURE: CPT | Performed by: ANESTHESIOLOGY

## 2023-04-25 PROCEDURE — 250N000011 HC RX IP 250 OP 636: Performed by: ANESTHESIOLOGY

## 2023-04-25 PROCEDURE — 360N000077 HC SURGERY LEVEL 4, PER MIN: Performed by: ORTHOPAEDIC SURGERY

## 2023-04-25 PROCEDURE — 258N000001 HC RX 258: Performed by: ORTHOPAEDIC SURGERY

## 2023-04-25 PROCEDURE — 82947 ASSAY GLUCOSE BLOOD QUANT: CPT | Performed by: ANESTHESIOLOGY

## 2023-04-25 PROCEDURE — 999N000063 XR KNEE PORT RIGHT 1/2 VIEWS: Mod: RT

## 2023-04-25 PROCEDURE — 82565 ASSAY OF CREATININE: CPT | Performed by: ANESTHESIOLOGY

## 2023-04-25 PROCEDURE — 36415 COLL VENOUS BLD VENIPUNCTURE: CPT | Performed by: PHYSICIAN ASSISTANT

## 2023-04-25 PROCEDURE — 258N000003 HC RX IP 258 OP 636: Performed by: ANESTHESIOLOGY

## 2023-04-25 PROCEDURE — C1776 JOINT DEVICE (IMPLANTABLE): HCPCS | Performed by: ORTHOPAEDIC SURGERY

## 2023-04-25 PROCEDURE — 99207 PR NO BILLABLE SERVICE THIS VISIT: CPT | Performed by: PHYSICIAN ASSISTANT

## 2023-04-25 PROCEDURE — 250N000013 HC RX MED GY IP 250 OP 250 PS 637: Performed by: PHYSICIAN ASSISTANT

## 2023-04-25 PROCEDURE — 250N000011 HC RX IP 250 OP 636: Performed by: ORTHOPAEDIC SURGERY

## 2023-04-25 PROCEDURE — 258N000003 HC RX IP 258 OP 636: Performed by: NURSE ANESTHETIST, CERTIFIED REGISTERED

## 2023-04-25 PROCEDURE — 82565 ASSAY OF CREATININE: CPT | Performed by: PHYSICIAN ASSISTANT

## 2023-04-25 PROCEDURE — 710N000009 HC RECOVERY PHASE 1, LEVEL 1, PER MIN: Performed by: ORTHOPAEDIC SURGERY

## 2023-04-25 PROCEDURE — 272N000001 HC OR GENERAL SUPPLY STERILE: Performed by: ORTHOPAEDIC SURGERY

## 2023-04-25 PROCEDURE — 250N000011 HC RX IP 250 OP 636: Performed by: NURSE ANESTHETIST, CERTIFIED REGISTERED

## 2023-04-25 PROCEDURE — 84132 ASSAY OF SERUM POTASSIUM: CPT | Performed by: ANESTHESIOLOGY

## 2023-04-25 PROCEDURE — 258N000003 HC RX IP 258 OP 636: Performed by: PHYSICIAN ASSISTANT

## 2023-04-25 PROCEDURE — 999N000141 HC STATISTIC PRE-PROCEDURE NURSING ASSESSMENT: Performed by: ORTHOPAEDIC SURGERY

## 2023-04-25 PROCEDURE — C1713 ANCHOR/SCREW BN/BN,TIS/BN: HCPCS | Performed by: ORTHOPAEDIC SURGERY

## 2023-04-25 DEVICE — GENESIS II RESURFACING PATELLA 41MM
Type: IMPLANTABLE DEVICE | Site: KNEE | Status: FUNCTIONAL
Brand: GENESIS II

## 2023-04-25 DEVICE — LEGION POSTERIOR STABILIZED                                    NONPOROUS FEMORAL SIZE 7 RIGHT
Type: IMPLANTABLE DEVICE | Site: KNEE | Status: FUNCTIONAL
Brand: LEGION

## 2023-04-25 DEVICE — GENESIS II NON-POROUS TIBIAL                                    BASEPLATE SIZE 8 RIGHT
Type: IMPLANTABLE DEVICE | Site: KNEE | Status: FUNCTIONAL
Brand: GENESIS II

## 2023-04-25 DEVICE — BONE CEMENT SIMPLEX FULL DOSE 6191-1-001: Type: IMPLANTABLE DEVICE | Site: KNEE | Status: FUNCTIONAL

## 2023-04-25 DEVICE — LEGION POSTERIOR STABILIZED HIGH                                    FLEX HIGHLY CROSS LINKED                                    POLYETHYLENE SIZE 7-8 13MM
Type: IMPLANTABLE DEVICE | Site: KNEE | Status: FUNCTIONAL
Brand: LEGION

## 2023-04-25 RX ORDER — CELECOXIB 100 MG/1
100 CAPSULE ORAL 2 TIMES DAILY
Status: DISCONTINUED | OUTPATIENT
Start: 2023-04-25 | End: 2023-04-26 | Stop reason: HOSPADM

## 2023-04-25 RX ORDER — ONDANSETRON 2 MG/ML
4 INJECTION INTRAMUSCULAR; INTRAVENOUS EVERY 30 MIN PRN
Status: DISCONTINUED | OUTPATIENT
Start: 2023-04-25 | End: 2023-04-25 | Stop reason: HOSPADM

## 2023-04-25 RX ORDER — LOSARTAN POTASSIUM 100 MG/1
100 TABLET ORAL EVERY EVENING
Status: DISCONTINUED | OUTPATIENT
Start: 2023-04-25 | End: 2023-04-26 | Stop reason: HOSPADM

## 2023-04-25 RX ORDER — ONDANSETRON 2 MG/ML
4 INJECTION INTRAMUSCULAR; INTRAVENOUS EVERY 6 HOURS PRN
Status: DISCONTINUED | OUTPATIENT
Start: 2023-04-25 | End: 2023-04-26 | Stop reason: HOSPADM

## 2023-04-25 RX ORDER — OXYCODONE HYDROCHLORIDE 5 MG/1
10 TABLET ORAL
Status: DISCONTINUED | OUTPATIENT
Start: 2023-04-25 | End: 2023-04-25 | Stop reason: HOSPADM

## 2023-04-25 RX ORDER — HYDROMORPHONE HCL IN WATER/PF 6 MG/30 ML
0.4 PATIENT CONTROLLED ANALGESIA SYRINGE INTRAVENOUS
Status: DISCONTINUED | OUTPATIENT
Start: 2023-04-25 | End: 2023-04-26 | Stop reason: HOSPADM

## 2023-04-25 RX ORDER — PROCHLORPERAZINE MALEATE 5 MG
5 TABLET ORAL EVERY 6 HOURS PRN
Status: DISCONTINUED | OUTPATIENT
Start: 2023-04-25 | End: 2023-04-26 | Stop reason: HOSPADM

## 2023-04-25 RX ORDER — OXYCODONE HYDROCHLORIDE 5 MG/1
5 TABLET ORAL EVERY 4 HOURS PRN
Qty: 33 TABLET | Refills: 0 | Status: SHIPPED | OUTPATIENT
Start: 2023-04-25

## 2023-04-25 RX ORDER — OXYCODONE HYDROCHLORIDE 5 MG/1
5 TABLET ORAL
Status: DISCONTINUED | OUTPATIENT
Start: 2023-04-25 | End: 2023-04-25 | Stop reason: HOSPADM

## 2023-04-25 RX ORDER — DEXMEDETOMIDINE HYDROCHLORIDE 4 UG/ML
INJECTION, SOLUTION INTRAVENOUS PRN
Status: DISCONTINUED | OUTPATIENT
Start: 2023-04-25 | End: 2023-04-25

## 2023-04-25 RX ORDER — ONDANSETRON 2 MG/ML
INJECTION INTRAMUSCULAR; INTRAVENOUS PRN
Status: DISCONTINUED | OUTPATIENT
Start: 2023-04-25 | End: 2023-04-25

## 2023-04-25 RX ORDER — NALOXONE HYDROCHLORIDE 0.4 MG/ML
0.4 INJECTION, SOLUTION INTRAMUSCULAR; INTRAVENOUS; SUBCUTANEOUS
Status: DISCONTINUED | OUTPATIENT
Start: 2023-04-25 | End: 2023-04-26 | Stop reason: HOSPADM

## 2023-04-25 RX ORDER — ACETAMINOPHEN 325 MG/1
650 TABLET ORAL EVERY 4 HOURS PRN
Qty: 100 TABLET | Refills: 0 | Status: SHIPPED | OUTPATIENT
Start: 2023-04-25

## 2023-04-25 RX ORDER — CEFAZOLIN SODIUM/WATER 2 G/20 ML
2 SYRINGE (ML) INTRAVENOUS
Status: COMPLETED | OUTPATIENT
Start: 2023-04-25 | End: 2023-04-25

## 2023-04-25 RX ORDER — BISACODYL 10 MG
10 SUPPOSITORY, RECTAL RECTAL DAILY PRN
Status: DISCONTINUED | OUTPATIENT
Start: 2023-04-25 | End: 2023-04-26 | Stop reason: HOSPADM

## 2023-04-25 RX ORDER — TRANEXAMIC ACID 650 MG/1
1950 TABLET ORAL ONCE
Status: COMPLETED | OUTPATIENT
Start: 2023-04-25 | End: 2023-04-25

## 2023-04-25 RX ORDER — LABETALOL HYDROCHLORIDE 5 MG/ML
5 INJECTION, SOLUTION INTRAVENOUS
Status: DISCONTINUED | OUTPATIENT
Start: 2023-04-25 | End: 2023-04-25 | Stop reason: HOSPADM

## 2023-04-25 RX ORDER — ACETAMINOPHEN 325 MG/1
975 TABLET ORAL EVERY 8 HOURS
Status: DISCONTINUED | OUTPATIENT
Start: 2023-04-25 | End: 2023-04-26 | Stop reason: HOSPADM

## 2023-04-25 RX ORDER — PROPOFOL 10 MG/ML
INJECTION, EMULSION INTRAVENOUS CONTINUOUS PRN
Status: DISCONTINUED | OUTPATIENT
Start: 2023-04-25 | End: 2023-04-25

## 2023-04-25 RX ORDER — ACETAMINOPHEN 325 MG/1
650 TABLET ORAL EVERY 4 HOURS PRN
Status: DISCONTINUED | OUTPATIENT
Start: 2023-04-28 | End: 2023-04-26 | Stop reason: HOSPADM

## 2023-04-25 RX ORDER — HYDROMORPHONE HCL IN WATER/PF 6 MG/30 ML
0.2 PATIENT CONTROLLED ANALGESIA SYRINGE INTRAVENOUS EVERY 5 MIN PRN
Status: DISCONTINUED | OUTPATIENT
Start: 2023-04-25 | End: 2023-04-25 | Stop reason: HOSPADM

## 2023-04-25 RX ORDER — CEFAZOLIN SODIUM 2 G/100ML
2 INJECTION, SOLUTION INTRAVENOUS EVERY 8 HOURS
Status: COMPLETED | OUTPATIENT
Start: 2023-04-25 | End: 2023-04-26

## 2023-04-25 RX ORDER — CEFAZOLIN SODIUM/WATER 2 G/20 ML
2 SYRINGE (ML) INTRAVENOUS EVERY 8 HOURS
Status: DISCONTINUED | OUTPATIENT
Start: 2023-04-25 | End: 2023-04-25

## 2023-04-25 RX ORDER — ATORVASTATIN CALCIUM 20 MG/1
20 TABLET, FILM COATED ORAL EVERY EVENING
Status: DISCONTINUED | OUTPATIENT
Start: 2023-04-25 | End: 2023-04-26 | Stop reason: HOSPADM

## 2023-04-25 RX ORDER — DIPHENHYDRAMINE HCL 12.5MG/5ML
12.5 LIQUID (ML) ORAL EVERY 6 HOURS PRN
Status: DISCONTINUED | OUTPATIENT
Start: 2023-04-25 | End: 2023-04-26

## 2023-04-25 RX ORDER — FENTANYL CITRATE 0.05 MG/ML
25 INJECTION, SOLUTION INTRAMUSCULAR; INTRAVENOUS EVERY 5 MIN PRN
Status: DISCONTINUED | OUTPATIENT
Start: 2023-04-25 | End: 2023-04-25 | Stop reason: HOSPADM

## 2023-04-25 RX ORDER — ASPIRIN 81 MG/1
162 TABLET ORAL DAILY
Status: DISCONTINUED | OUTPATIENT
Start: 2023-04-25 | End: 2023-04-26 | Stop reason: HOSPADM

## 2023-04-25 RX ORDER — SODIUM CHLORIDE, SODIUM LACTATE, POTASSIUM CHLORIDE, CALCIUM CHLORIDE 600; 310; 30; 20 MG/100ML; MG/100ML; MG/100ML; MG/100ML
INJECTION, SOLUTION INTRAVENOUS CONTINUOUS
Status: DISCONTINUED | OUTPATIENT
Start: 2023-04-25 | End: 2023-04-25 | Stop reason: HOSPADM

## 2023-04-25 RX ORDER — HYDRALAZINE HYDROCHLORIDE 20 MG/ML
10 INJECTION INTRAMUSCULAR; INTRAVENOUS EVERY 4 HOURS PRN
Status: DISCONTINUED | OUTPATIENT
Start: 2023-04-25 | End: 2023-04-26 | Stop reason: HOSPADM

## 2023-04-25 RX ORDER — HYDROXYZINE HYDROCHLORIDE 10 MG/1
10 TABLET, FILM COATED ORAL EVERY 6 HOURS PRN
Status: DISCONTINUED | OUTPATIENT
Start: 2023-04-25 | End: 2023-04-26 | Stop reason: HOSPADM

## 2023-04-25 RX ORDER — ACETAMINOPHEN 325 MG/1
975 TABLET ORAL ONCE
Status: COMPLETED | OUTPATIENT
Start: 2023-04-25 | End: 2023-04-25

## 2023-04-25 RX ORDER — CEFAZOLIN SODIUM/WATER 2 G/20 ML
2 SYRINGE (ML) INTRAVENOUS SEE ADMIN INSTRUCTIONS
Status: DISCONTINUED | OUTPATIENT
Start: 2023-04-25 | End: 2023-04-25 | Stop reason: HOSPADM

## 2023-04-25 RX ORDER — FENTANYL CITRATE 50 UG/ML
25 INJECTION, SOLUTION INTRAMUSCULAR; INTRAVENOUS ONCE
Status: COMPLETED | OUTPATIENT
Start: 2023-04-25 | End: 2023-04-25

## 2023-04-25 RX ORDER — NALOXONE HYDROCHLORIDE 0.4 MG/ML
0.2 INJECTION, SOLUTION INTRAMUSCULAR; INTRAVENOUS; SUBCUTANEOUS
Status: DISCONTINUED | OUTPATIENT
Start: 2023-04-25 | End: 2023-04-26 | Stop reason: HOSPADM

## 2023-04-25 RX ORDER — CELECOXIB 200 MG/1
400 CAPSULE ORAL ONCE
Status: COMPLETED | OUTPATIENT
Start: 2023-04-25 | End: 2023-04-25

## 2023-04-25 RX ORDER — FENTANYL CITRATE 0.05 MG/ML
50 INJECTION, SOLUTION INTRAMUSCULAR; INTRAVENOUS EVERY 5 MIN PRN
Status: DISCONTINUED | OUTPATIENT
Start: 2023-04-25 | End: 2023-04-25 | Stop reason: HOSPADM

## 2023-04-25 RX ORDER — CELECOXIB 200 MG/1
200 CAPSULE ORAL DAILY
Qty: 30 CAPSULE | Refills: 0 | Status: SHIPPED | OUTPATIENT
Start: 2023-04-25

## 2023-04-25 RX ORDER — OXYCODONE HYDROCHLORIDE 5 MG/1
10 TABLET ORAL EVERY 4 HOURS PRN
Status: DISCONTINUED | OUTPATIENT
Start: 2023-04-25 | End: 2023-04-26 | Stop reason: HOSPADM

## 2023-04-25 RX ORDER — HYDROMORPHONE HCL IN WATER/PF 6 MG/30 ML
0.2 PATIENT CONTROLLED ANALGESIA SYRINGE INTRAVENOUS
Status: DISCONTINUED | OUTPATIENT
Start: 2023-04-25 | End: 2023-04-26 | Stop reason: HOSPADM

## 2023-04-25 RX ORDER — ONDANSETRON 4 MG/1
4 TABLET, ORALLY DISINTEGRATING ORAL EVERY 30 MIN PRN
Status: DISCONTINUED | OUTPATIENT
Start: 2023-04-25 | End: 2023-04-25 | Stop reason: HOSPADM

## 2023-04-25 RX ORDER — ASPIRIN 81 MG/1
162 TABLET ORAL DAILY
Qty: 120 TABLET | Refills: 0 | Status: SHIPPED | OUTPATIENT
Start: 2023-04-25

## 2023-04-25 RX ORDER — HYDRALAZINE HYDROCHLORIDE 20 MG/ML
2.5-5 INJECTION INTRAMUSCULAR; INTRAVENOUS
Status: DISCONTINUED | OUTPATIENT
Start: 2023-04-25 | End: 2023-04-25 | Stop reason: HOSPADM

## 2023-04-25 RX ORDER — SODIUM CHLORIDE, SODIUM LACTATE, POTASSIUM CHLORIDE, CALCIUM CHLORIDE 600; 310; 30; 20 MG/100ML; MG/100ML; MG/100ML; MG/100ML
INJECTION, SOLUTION INTRAVENOUS CONTINUOUS
Status: DISCONTINUED | OUTPATIENT
Start: 2023-04-25 | End: 2023-04-26 | Stop reason: HOSPADM

## 2023-04-25 RX ORDER — AMOXICILLIN 250 MG
1-2 CAPSULE ORAL 2 TIMES DAILY
Qty: 100 TABLET | Refills: 0 | Status: SHIPPED | OUTPATIENT
Start: 2023-04-25

## 2023-04-25 RX ORDER — AMOXICILLIN 250 MG
1 CAPSULE ORAL 2 TIMES DAILY
Status: DISCONTINUED | OUTPATIENT
Start: 2023-04-25 | End: 2023-04-26 | Stop reason: HOSPADM

## 2023-04-25 RX ORDER — CHLOROPROCAINE HYDROCHLORIDE 20 MG/ML
INJECTION, SOLUTION EPIDURAL; INFILTRATION; INTRACAUDAL; PERINEURAL PRN
Status: DISCONTINUED | OUTPATIENT
Start: 2023-04-25 | End: 2023-04-25

## 2023-04-25 RX ORDER — LIDOCAINE 40 MG/G
CREAM TOPICAL
Status: DISCONTINUED | OUTPATIENT
Start: 2023-04-25 | End: 2023-04-26 | Stop reason: HOSPADM

## 2023-04-25 RX ORDER — DEXAMETHASONE SODIUM PHOSPHATE 4 MG/ML
INJECTION, SOLUTION INTRA-ARTICULAR; INTRALESIONAL; INTRAMUSCULAR; INTRAVENOUS; SOFT TISSUE PRN
Status: DISCONTINUED | OUTPATIENT
Start: 2023-04-25 | End: 2023-04-25

## 2023-04-25 RX ORDER — POLYETHYLENE GLYCOL 3350 17 G/17G
17 POWDER, FOR SOLUTION ORAL DAILY
Status: DISCONTINUED | OUTPATIENT
Start: 2023-04-26 | End: 2023-04-26 | Stop reason: HOSPADM

## 2023-04-25 RX ORDER — OXYCODONE HYDROCHLORIDE 5 MG/1
5 TABLET ORAL EVERY 4 HOURS PRN
Status: DISCONTINUED | OUTPATIENT
Start: 2023-04-25 | End: 2023-04-26 | Stop reason: HOSPADM

## 2023-04-25 RX ORDER — ONDANSETRON 4 MG/1
4 TABLET, ORALLY DISINTEGRATING ORAL EVERY 6 HOURS PRN
Status: DISCONTINUED | OUTPATIENT
Start: 2023-04-25 | End: 2023-04-26 | Stop reason: HOSPADM

## 2023-04-25 RX ORDER — VANCOMYCIN HYDROCHLORIDE 1 G/20ML
INJECTION, POWDER, LYOPHILIZED, FOR SOLUTION INTRAVENOUS PRN
Status: DISCONTINUED | OUTPATIENT
Start: 2023-04-25 | End: 2023-04-25 | Stop reason: HOSPADM

## 2023-04-25 RX ADMIN — ATORVASTATIN CALCIUM 20 MG: 20 TABLET, FILM COATED ORAL at 20:32

## 2023-04-25 RX ADMIN — DEXMEDETOMIDINE HYDROCHLORIDE 12 MCG: 200 INJECTION INTRAVENOUS at 12:58

## 2023-04-25 RX ADMIN — SENNOSIDES AND DOCUSATE SODIUM 1 TABLET: 50; 8.6 TABLET ORAL at 20:32

## 2023-04-25 RX ADMIN — BUPIVACAINE HYDROCHLORIDE 15 ML: 5 INJECTION, SOLUTION PERINEURAL at 12:33

## 2023-04-25 RX ADMIN — PROPOFOL 75 MCG/KG/MIN: 10 INJECTION, EMULSION INTRAVENOUS at 12:52

## 2023-04-25 RX ADMIN — Medication 2 G: at 12:48

## 2023-04-25 RX ADMIN — CEFAZOLIN SODIUM 2 G: 2 INJECTION, SOLUTION INTRAVENOUS at 20:32

## 2023-04-25 RX ADMIN — PHENYLEPHRINE HYDROCHLORIDE 150 MCG: 10 INJECTION INTRAVENOUS at 13:47

## 2023-04-25 RX ADMIN — ONDANSETRON 4 MG: 2 INJECTION INTRAMUSCULAR; INTRAVENOUS at 14:01

## 2023-04-25 RX ADMIN — FENTANYL CITRATE 25 MCG: 50 INJECTION, SOLUTION INTRAMUSCULAR; INTRAVENOUS at 12:40

## 2023-04-25 RX ADMIN — PHENYLEPHRINE HYDROCHLORIDE 150 MCG: 10 INJECTION INTRAVENOUS at 13:29

## 2023-04-25 RX ADMIN — ACETAMINOPHEN 975 MG: 325 TABLET ORAL at 20:32

## 2023-04-25 RX ADMIN — LOSARTAN POTASSIUM 100 MG: 100 TABLET, FILM COATED ORAL at 20:32

## 2023-04-25 RX ADMIN — CHLOROPROCAINE HYDROCHLORIDE 60 MG: 20 INJECTION, SOLUTION EPIDURAL; INFILTRATION; INTRACAUDAL; PERINEURAL at 12:52

## 2023-04-25 RX ADMIN — TRANEXAMIC ACID 1950 MG: 650 TABLET ORAL at 11:00

## 2023-04-25 RX ADMIN — SODIUM CHLORIDE, POTASSIUM CHLORIDE, SODIUM LACTATE AND CALCIUM CHLORIDE: 600; 310; 30; 20 INJECTION, SOLUTION INTRAVENOUS at 13:22

## 2023-04-25 RX ADMIN — CELECOXIB 100 MG: 100 CAPSULE ORAL at 22:42

## 2023-04-25 RX ADMIN — ASPIRIN 162 MG: 81 TABLET, COATED ORAL at 16:30

## 2023-04-25 RX ADMIN — SODIUM CHLORIDE, POTASSIUM CHLORIDE, SODIUM LACTATE AND CALCIUM CHLORIDE: 600; 310; 30; 20 INJECTION, SOLUTION INTRAVENOUS at 11:00

## 2023-04-25 RX ADMIN — PHENYLEPHRINE HYDROCHLORIDE 150 MCG: 10 INJECTION INTRAVENOUS at 13:22

## 2023-04-25 RX ADMIN — DEXAMETHASONE SODIUM PHOSPHATE 8 MG: 4 INJECTION, SOLUTION INTRA-ARTICULAR; INTRALESIONAL; INTRAMUSCULAR; INTRAVENOUS; SOFT TISSUE at 12:48

## 2023-04-25 RX ADMIN — SODIUM CHLORIDE, POTASSIUM CHLORIDE, SODIUM LACTATE AND CALCIUM CHLORIDE: 600; 310; 30; 20 INJECTION, SOLUTION INTRAVENOUS at 19:23

## 2023-04-25 RX ADMIN — PHENYLEPHRINE HYDROCHLORIDE 150 MCG: 10 INJECTION INTRAVENOUS at 13:14

## 2023-04-25 RX ADMIN — ACETAMINOPHEN 975 MG: 325 TABLET ORAL at 11:00

## 2023-04-25 RX ADMIN — CELECOXIB 400 MG: 200 CAPSULE ORAL at 11:00

## 2023-04-25 ASSESSMENT — ACTIVITIES OF DAILY LIVING (ADL)
ADLS_ACUITY_SCORE: 35

## 2023-04-25 ASSESSMENT — LIFESTYLE VARIABLES: TOBACCO_USE: 0

## 2023-04-25 ASSESSMENT — ENCOUNTER SYMPTOMS
SEIZURES: 0
DYSRHYTHMIAS: 0

## 2023-04-25 ASSESSMENT — COPD QUESTIONNAIRES: COPD: 0

## 2023-04-25 NOTE — CONSULTS
[facility]  BRIEF HOSPITALIST CONSULT NOTE- Hospitalist Service     Date of Admission:  4/25/2023  Consult Requested by: Leopoldo Huston PA-C  Reason for Consult: Hospitalist Consult for medical management s/p right TKA, history of prostate cancer, migraine, HTN, vertigo, medication reconciliation  PRIMARY CARE PROVIDER:    Xiang Jaffe    Assessment & Plan   Marco Hendricks is a 83 year old male admitted on 4/25/2023.    Past medical history significant for OA, CAD (based on calcium scoring), HTN, HLP, Ascending aorta dilatation, Vertigo, Nephrolithiasis, Migraines, Allergic rhinitis, History of prostate cancer s/p RA seed implantation, History of BCC s/p Mohs who underwent an elective right TKA.      EMR was reviewed that included pre-op H&P and PTA medications.  Vital signs reviewed and BP elevated at 152/90 otherwise within normal limits.  Formal consult will be deferred.  Please see outlined plan below.  Admission PTA (Home) medication reconciliation has been completed.  Hospitalist will chart check on POD #1.       OA with degenerative changes of the right knee s/p right TKA  POD #0.   - Orthopedic Surgery is managing.   --Analgesic/pain management, DVT prophylaxis, PT/OT consultation per Ortho.    - HGB check on POD #1.    - Encourage utilization of incentive spirometer.     CAD (based on calcium scoring)  HTN  HLP  - Resumed on PTA atorvastatin 20 mg/d.    - Resumed on PTA losartan 100 mg every evening.  Hold parameters in place.    - PRN IV hydralazine available.    - Assess for urinary retention per protocol.    - Check creatinine now and BMP tomorrow morning.      Suspected CKD  EMR reviewed and creatinine ranged between 1.05-1.10 with GFR between 60-70's.    - Check creatinine tonight and BMP in the morning.      Ascending aorta dilatation  - Continue with outpatient surveillance.      Vertigo  Noted on pre-op H&P but not on any medications.  No interventions.   "    Nephrolithiasis  Noted on pre-op H&P but not on any medications.  No interventions.      Migraines  Noted on pre-op H&P but not on any medications.  No interventions.      Allergic rhinitis  - Hold PTA Allegra 180 mg/d and resume at discharge.      History of prostate cancer s/p RA seed implantation  No interventions at this time.      History of BCC s/p Mohs  No interventions at this time.      Clinically Significant Risk Factors Present on Admission                  # Hypertension: home medication list includes antihypertensive(s)      # Overweight: Estimated body mass index is 27.09 kg/m  as calculated from the following:    Height as of this encounter: 1.778 m (5' 10\").    Weight as of this encounter: 85.6 kg (188 lb 12.8 oz).             Diet: Advance Diet as Tolerated: Regular Diet Adult  Discharge Instruction - Regular Diet Adult     DVT Prophylaxis: Defer to primary service   Long Catheter: Not present  Code Status: Full Code; per Ortho.     Disposition Plan    Per Ortho.       Ilya Stevens PA-C  [facility]  Securely message with the Vocera Web Console (learn more here)  Text page via [employer] Paging/Directory        "

## 2023-04-25 NOTE — ANESTHESIA PROCEDURE NOTES
"Intrathecal Procedure Note    Pre-Procedure   Staff -        Anesthesiologist:  Rich Villa MD       Performed By: Anesthesiologist       Location: OR       Pre-Anesthestic Checklist: patient identified, IV checked, risks and benefits discussed, informed consent, monitors and equipment checked, pre-op evaluation and at physician/surgeon's request  Timeout:       Correct Patient: Yes        Correct Procedure: Yes        Correct Site: Yes        Correct Position: Yes   Procedure Documentation  Procedure: intrathecal       Patient Position: sitting       Patient Prep/Sterile Barriers: sterile gloves, mask, patient draped       Skin prep: Chloraprep       Insertion Site: L3-4. (midline approach).       Spinal Needle Type: Celso tip       Introducer used       Introducer: 20 G       # of attempts: 1 and  # of redirects:  0    Assessment/Narrative         Paresthesias: No.       CSF fluid: clear.     Comments:  The patient was prepped and draped in a sterile fashion.  Topical anesthesia was injected subcutaneously using 1% lidocaine.  A 25G Pencan needle was advanced via a 20 G introducer at the the L3-4 level.  Clear CSF obtained, with birefringence on aspiration.  CSF freely aspirated after injection of 60 mg chloroprocaine.  No paresthesias reported by patient, who tolerated the procedure well.      FOR [facility] ([facility]) ONLY:   Pain Team Contact information: please page the Pain Team Via [employer]. Search \"Pain\". During daytime hours, please page the attending first. At night please page the resident first.      "

## 2023-04-25 NOTE — ANESTHESIA POSTPROCEDURE EVALUATION
Patient: Marco Hendricks    Procedure: Procedure(s):  Right open total knee arthroplasty       Anesthesia Type:  Spinal    Note:     Postop Pain Control: Uneventful            Sign Out: Well controlled pain   PONV:    Neuro/Psych: Uneventful            Sign Out: Acceptable/Baseline neuro status   Airway/Respiratory: Uneventful            Sign Out: Acceptable/Baseline resp. status   CV/Hemodynamics: Uneventful            Sign Out: Acceptable CV status; No obvious hypovolemia; No obvious fluid overload   Other NRE:    DID A NON-ROUTINE EVENT OCCUR?            Last vitals:  Vitals Value Taken Time   /87 04/25/23 1450   Temp 36.8  C (98.3  F) 04/25/23 1430   Pulse 67 04/25/23 1452   Resp 46 04/25/23 1452   SpO2 93 % 04/25/23 1452   Vitals shown include unvalidated device data.    Electronically Signed By: Rich Villa MD  April 25, 2023  4:49 PM

## 2023-04-25 NOTE — ANESTHESIA PROCEDURE NOTES
Adductor canal Procedure Note    Pre-Procedure   Staff -        Anesthesiologist:  Rich Villa MD       Performed By: Anesthesiologist       Location: pre-op       Pre-Anesthestic Checklist: patient identified, IV checked, site marked, risks and benefits discussed, informed consent, monitors and equipment checked, pre-op evaluation, at physician/surgeon's request and post-op pain management  Timeout:       Correct Patient: Yes        Correct Procedure: Yes        Correct Site: Yes        Correct Position: Yes        Correct Laterality: Yes        Site Marked: Yes  Procedure Documentation  Procedure: Adductor canal       Laterality: right       Patient Position: supine       Patient Prep/Sterile Barriers: sterile gloves, mask       Skin prep: Chloraprep       Local skin infiltrated with mL of 1% lidocaine.        Needle Type: insulated       Needle Gauge: 22.        Needle Length (Inches): 3.13        Ultrasound guided       1. Ultrasound was used to identify targeted nerve, plexus, vascular marker, or fascial plane and place a needle adjacent to it in real-time.       2. Ultrasound was used to visualize the spread of anesthetic in close proximity to the above referenced structure.       3. A permanent image is entered into the patient's record.       4. The visualized anatomic structures appeared normal.       5. There were no apparent abnormal pathologic findings.    Assessment/Narrative         The placement was negative for: blood aspirated, painful injection and site bleeding       Paresthesias: No.       Bolus given via needle..        Secured via.        Insertion/Infusion Method: Single Shot    Medication(s) Administered   Bupivacaine 0.5% w/ 1:400K Epi (Injection) - Injection   15 mL - 4/25/2023 12:33:00 PM   Comments:  Femoral artery clearly identified deep to the sartorius muscle via ultrasound imaging.  After appropriate timeout procedure, needle was advanced under direct ultrasound guidance.  15  "ml of 0.5% bupivacaine with 1:400,000 epinephrine was incrementally injected with negative aspiration every 5 ml.  Patient tolerated procedure well.    Ultrasound Interpretation, peripheral nerve block    1.  As noted above, under ultrasound guidance, the needle was inserted and placed in close proximity to the saphenous nerve.  2. Ultrasound was also used to visualize the spread of the anesthetic in close proximity to the nerve being blocked.  3. The nerve appeared anatomically normal.  4. There were no apparent abnormal pathological findings.  5. A permanent ultrasound image was saved n the patient's record.    Rich Villa MD   4:48 PM        FOR [facility] ([facility]) ONLY:   Pain Team Contact information: please page the Pain Team Via [employer]. Search \"Pain\". During daytime hours, please page the attending first. At night please page the resident first.      "

## 2023-04-25 NOTE — PROCEDURES
DATE OF SERVICE: 4/25/2023    SURGEON   NENA PORTER MD     FIRST ASSISTANT   DEVIKA HUSTON PA-C   Please bill for Mr. Huston as first assistant as there was no resident available to assist in this case. Assistants were necessary and performed positioning, draping, retraction, suturing and wound dressing tasks throughout the surgical procedure. The assistant was present for the entire procedure.    ASSISTANTS  SHAAN Mars     PREOPERATIVE DIAGNOSIS   Endstage right knee tricompartmental osteoarthritis.     POSTOPERATIVE DIAGNOSIS   Endstage right knee tricompartmental osteoarthritis.     PROCEDURE   Right  total knee arthroplasty using [employer]hew components, a size 7 Legion posterior stabilized femur, a size 8 tibia, a size 41 mm round patella, and a size 13 posterior stabilized polyethylene insert.     INDICATIONS FOR SURGERY   Marco Hendricks [id number] is a 83 year old-year-old male with a long history of right knee pain. The patient had failed all of the conservative and reasonable options. After discussing with the patient, it was decided that they would benefit from the above-mentioned procedure. Informed consent was obtained. See clinic documentation for details.    ANESTHESIA   Spinal block with an abductor canal block.     FINDINGS   There was tricompartmental osteoarthritis. Ligaments were intact otherwise.     DESCRIPTION OF PROCEDURE   Marco Hendricks was correctly identified by myself in the PAR and their right lower extremity was marked. A single-shot adductor canal block was placed. The patient was then taken to the operating room where a spinal anesthetic was placed. The patient was placed supine. A tourniquet was placed around the right proximal thigh. A bump was placed underneath the right hip. The patient was then prepped with Avagard, followed by a 10 minute Betadine scrub, alcohol paint, DuraPrep and then draped in the usual fashion. A timeout was performed. The tourniquet was  then inflated to 300 mmHg. The incision was made just medial to the patella for the mini sub-vastus approach after injection with the anesthetic solution, and carried down with sharp dissection. The vastus medialis was then released from its bed and swept laterally. The fat pad excision was performed at this time, as well as a medial release. The knee was then brought into flexion. The anterior horns of the medial and lateral menisci were excised, as well as the ACL and PCL. A PCL retractor was placed. The external tibial cutting guide was placed at 0 degrees slope. This was pinned with a planned 2 mm cut off the medial side and the cut was made with an oscillating saw. This was sized to a(n) 8, pinned in place, and we had excellent alignment with an alignment lay and cortical contact with the trial. The tibia was reamed and punched in the usual fashion and the trial tibial component was removed. Attention was next turned to the femur. An intramedullary hole was drilled and we placed the distal cutting guide at 5 degrees of valgus. The block was pinned into place and the distal cut was made in the usual fashion. We then placed the AP sizing guide and it was felt that a size 7 would give us the best fit. This was placed in 3 degrees of external rotation which matched up nicely with [place]'s line as well as the epicondylar access. The 4-in-1 cutting block was pinned into place. The anterior, posterior and chamfer cuts were then made in the usual fashion. The trial femoral component was placed and the box cut was made with the reamer and box chisel in the usual fashion. At this point, we trialed the knee. We placed a 13 mm spacer. We had excellent balance throughout. The tibial trial component was removed after it was marked in proper alignment. The patella was then clamped with 2 towel clips. An oscillating saw was used to make a flat cut. The patella was sized to a round 41 mm patella, the lug holes were drilled  and then undermined in the usual fashion with a curette. The trial patella was placed. It fit nicely and sat down completely. All trial components were removed at this point. All bony surfaces were drilled with a small 3 mm drill bit and then thoroughly irrigated and dried. The rest of the joint cocktail was injected into the posterior capsule. A bone plug was placed in the central femoral canal. Two batches of Simplex cement were prepared and placed on the tibia and tibial component. The tibial component was impacted in place and the excess cement was removed. Next, cement was placed on the femoral component and on the femur and the femoral component was impacted into place. The excess cement was removed. A size 13 spacer was placed and the knee fully extended. The patella was then irrigated, dried and cement was placed onto the patella and patellar component. The patellar component was clamped in place and the excess cement removed. The knee was then lavaged with a dilute Betadine solution for 3 minutes and then irrigated again with normal saline to remove all the Betadine. The permanent 13 mm spacer was inserted with the  tool. The patella was relocated, a gram of vancomycin powder was placed in the joint, the tourniquet was deflated, and then the retinaculum was closed with a running #2 Quill suture. The subdermal layer was closed with a 0 Stratafix running suture, subdermal layer closed with a 2-0 Stratafix running suture and the skin closed with 3-0 Quill. Steri-Strips, as well as sterile dressings, an ACE bandage and ice pack were placed. There were no complications. Sponge counts correct. Tourniquet time 36 minutes. The patient was taken to PAR in stable condition.     DRAINS  None    SPECIMENS  None    COMPLICATIONS  None    ESTIMATED BLOOD LOSS  25 mL    CONDITION ON DISCHARGE FROM OR  Satisfactory    PLAN  1. Antibiotic Prophylaxis was given included Ancef 2 gm. Antibiotics given within 1 hour of  surgical incision and discontinued within 24 hrs.   2. DVT prophylaxis ASA given within 24 hours    3. Weight Bearing WBAT (Weight bearing as tolerated).   4. Discharge anticipated date POD #1 to Home   5. Pain Medication oxycodone, Tylenol and Celebrex  6. Change dressing on POD #1. Discharge with AG dressing.    NENA PORTER MD      @C(1)@ [facility] - [phone]

## 2023-04-25 NOTE — ANESTHESIA CARE TRANSFER NOTE
Patient: Marco Hendricks    Procedure: Procedure(s):  Right open total knee arthroplasty       Diagnosis: Osteoarthritis of right knee [M17.11]  Diagnosis Additional Information: No value filed.    Anesthesia Type:   Spinal     Note:    Oropharynx: oropharynx clear of all foreign objects  Level of Consciousness: awake  Oxygen Supplementation: room air    Independent Airway: airway patency satisfactory and stable  Dentition: dentition unchanged  Vital Signs Stable: post-procedure vital signs reviewed and stable  Report to RN Given: handoff report given  Patient transferred to: PACU  Comments: At end of procedure, spontaneous respirations, patient alert to voice, able to follow commands. Patient breathing room air to PACU. SpO2, NiBP, and EKG monitors and alarms on and functioning, Amalia Hugger warmer connected to patient gown, report on patient's clinical status given to PACU RN, RN questions answered.  Handoff Report: Identifed the Patient, Identified the Reponsible Provider, Reviewed the pertinent medical history, Discussed the surgical course, Reviewed Intra-OP anesthesia mangement and issues during anesthesia, Set expectations for post-procedure period and Allowed opportunity for questions and acknowledgement of understanding      Vitals:  Vitals Value Taken Time   /64    Temp     Pulse 69 04/25/23 1414   Resp 13 04/25/23 1414   SpO2 97 % 04/25/23 1414   Vitals shown include unvalidated device data.    Electronically Signed By: Christine Marie Volp Hodgkins, CRNA, APRN CRNA  April 25, 2023  2:15 PM

## 2023-04-25 NOTE — PROGRESS NOTES
Patient vital signs are at baseline: Yes  Patient able to ambulate as they were prior to admission or with assist devices provided by therapies during their stay:  No,  Reason:  PT has not assessed pt yet.  Patient MUST void prior to discharge:  Yes  Patient able to tolerate oral intake:  Yes  Pain has adequate pain control using Oral analgesics:  Yes  Does patient have an identified :  Yes  Has goal D/C date and time been discussed with patient:  Yes

## 2023-04-25 NOTE — PROGRESS NOTES
Patient arrived on unit from PACU around 1500.  Patient is A/Ox4 and call light appropriate.  He states he has no pain.  His pedal pulses are intact, skin is warm. His shin was still numb upon arrival but has since gotten better.  He is able to wiggle his toes and move his leg. PT is planning on working with him yet this evening.  Patient has voided since arrival, denies nausea, and is tolerating food. Denies any SOB and is on RA with saturations >95%. IVF infusing.

## 2023-04-26 ENCOUNTER — APPOINTMENT (OUTPATIENT)
Dept: PHYSICAL THERAPY | Facility: CLINIC | Age: 83
End: 2023-04-26
Attending: ORTHOPAEDIC SURGERY
Payer: COMMERCIAL

## 2023-04-26 VITALS
SYSTOLIC BLOOD PRESSURE: 133 MMHG | OXYGEN SATURATION: 95 % | WEIGHT: 188.8 LBS | DIASTOLIC BLOOD PRESSURE: 82 MMHG | HEART RATE: 81 BPM | HEIGHT: 70 IN | RESPIRATION RATE: 16 BRPM | TEMPERATURE: 98 F | BODY MASS INDEX: 27.03 KG/M2

## 2023-04-26 LAB
ANION GAP SERPL CALCULATED.3IONS-SCNC: 9 MMOL/L (ref 7–15)
BUN SERPL-MCNC: 15.3 MG/DL (ref 8–23)
CALCIUM SERPL-MCNC: 9 MG/DL (ref 8.8–10.2)
CHLORIDE SERPL-SCNC: 106 MMOL/L (ref 98–107)
CREAT SERPL-MCNC: 1.05 MG/DL (ref 0.67–1.17)
DEPRECATED HCO3 PLAS-SCNC: 25 MMOL/L (ref 22–29)
GFR SERPL CREATININE-BSD FRML MDRD: 71 ML/MIN/1.73M2
GLUCOSE SERPL-MCNC: 121 MG/DL (ref 70–99)
HGB BLD-MCNC: 11.5 G/DL (ref 13.3–17.7)
POTASSIUM SERPL-SCNC: 4.5 MMOL/L (ref 3.4–5.3)
SODIUM SERPL-SCNC: 140 MMOL/L (ref 136–145)

## 2023-04-26 PROCEDURE — 36415 COLL VENOUS BLD VENIPUNCTURE: CPT | Performed by: PHYSICIAN ASSISTANT

## 2023-04-26 PROCEDURE — 85018 HEMOGLOBIN: CPT | Performed by: PHYSICIAN ASSISTANT

## 2023-04-26 PROCEDURE — 250N000011 HC RX IP 250 OP 636: Performed by: ORTHOPAEDIC SURGERY

## 2023-04-26 PROCEDURE — 97116 GAIT TRAINING THERAPY: CPT | Mod: GP | Performed by: PHYSICAL THERAPIST

## 2023-04-26 PROCEDURE — 80048 BASIC METABOLIC PNL TOTAL CA: CPT | Performed by: PHYSICIAN ASSISTANT

## 2023-04-26 PROCEDURE — 250N000013 HC RX MED GY IP 250 OP 250 PS 637: Performed by: PHYSICIAN ASSISTANT

## 2023-04-26 PROCEDURE — 99207 PR NO BILLABLE SERVICE THIS VISIT: CPT | Performed by: NURSE PRACTITIONER

## 2023-04-26 PROCEDURE — 97110 THERAPEUTIC EXERCISES: CPT | Mod: GP | Performed by: PHYSICAL THERAPIST

## 2023-04-26 RX ADMIN — ASPIRIN 162 MG: 81 TABLET, COATED ORAL at 08:55

## 2023-04-26 RX ADMIN — CELECOXIB 100 MG: 100 CAPSULE ORAL at 08:55

## 2023-04-26 RX ADMIN — ACETAMINOPHEN 975 MG: 325 TABLET ORAL at 04:03

## 2023-04-26 RX ADMIN — SENNOSIDES AND DOCUSATE SODIUM 1 TABLET: 50; 8.6 TABLET ORAL at 08:56

## 2023-04-26 RX ADMIN — CEFAZOLIN SODIUM 2 G: 2 INJECTION, SOLUTION INTRAVENOUS at 04:03

## 2023-04-26 ASSESSMENT — ACTIVITIES OF DAILY LIVING (ADL)
ADLS_ACUITY_SCORE: 35
ADLS_ACUITY_SCORE: 35
ADLS_ACUITY_SCORE: 37
ADLS_ACUITY_SCORE: 35
ADLS_ACUITY_SCORE: 35

## 2023-04-26 NOTE — PLAN OF CARE
Goal Outcome Evaluation:      Plan of Care Reviewed With: patient    Overall Patient Progress: improvingOverall Patient Progress: improving     Patient vital signs are at baseline: Yes  Patient able to ambulate as they were prior to admission or with assist devices provided by therapies during their stay:  Yes  Patient MUST void prior to discharge:  Yes  Patient able to tolerate oral intake:  Yes  Pain has adequate pain control using Oral analgesics:  Yes  Does patient have an identified :  Yes  Has goal D/C date and time been discussed with patient:  Yes    Dressing changed. Denies pain. Discharge instruction went over with pt, verbalized understanding. Will discharge home with family.

## 2023-04-26 NOTE — PLAN OF CARE
Goal Outcome Evaluation:    Patient vital signs are at baseline: Yes  Patient able to ambulate as they were prior to admission or with assist devices provided by therapies during their stay:  Yes  Patient MUST void prior to discharge:  Yes  Patient able to tolerate oral intake:  Yes  Pain has adequate pain control using Oral analgesics:  Yes  Does patient have an identified :  Yes  Has goal D/C date and time been discussed with patient:  Yes    A&O x4.  Ambulated in hdez x2 w/ A1 GB&walker  Denies pain  CMS intact  Dressing CDI  Plan to discharge home today.

## 2023-04-26 NOTE — PROGRESS NOTES
04/25/23 1800   Appointment Info   Signing Clinician's Name / Credentials (PT) Luan Zarate DPT   Rehab Comments (PT) WBAT RLE, ROM as rishi   Quick Adds   Quick Adds Certification   Living Environment   People in Home other relative(s);child(declan), adult   Current Living Arrangements house   Home Accessibility stairs to enter home   Number of Stairs, Main Entrance 3   Stair Railings, Main Entrance railing on right side (ascending)   Transportation Anticipated family or friend will provide   Living Environment Comments Pt will be staying for several weeks at shae and son-in-law's home where he will have good 24/7 assist, 3 MARRY with all needs met on first floor   Self-Care   Usual Activity Tolerance good   Current Activity Tolerance moderate   Equipment Currently Used at Home none   Fall history within last six months no   Activity/Exercise/Self-Care Comment At baseline pt IND with mobility and ADL's   General Information   Onset of Illness/Injury or Date of Surgery 04/25/23   Referring Physician Leopoldo Huston PA-C   Patient/Family Therapy Goals Statement (PT) go home   Pertinent History of Current Problem (include personal factors and/or comorbidities that impact the POC) Per chart: Past medical history significant for OA, CAD (based on calcium scoring), HTN, HLP, Ascending aorta dilatation, Vertigo, Nephrolithiasis, Migraines, Allergic rhinitis, History of prostate cancer s/p RA seed implantation, History of BCC s/p Mohs who underwent an elective right TKA on 4/25/23, POD#0   Existing Precautions/Restrictions fall;weight bearing   Weight-Bearing Status - RLE weight-bearing as tolerated   Cognition   Affect/Mental Status (Cognition) WNL   Orientation Status (Cognition) oriented x 4   Follows Commands (Cognition) WNL   Pain Assessment   Patient Currently in Pain No   Integumentary/Edema   Integumentary/Edema no deficits were identifed   Posture    Posture Not impaired   Range of Motion (ROM)   Range of  Motion ROM deficits secondary to surgical procedure;ROM deficits secondary to pain   ROM Comment deficits with right knee after TKA with AROM ~0-90, otherwise appears grossly WFL   Strength (Manual Muscle Testing)   Strength (Manual Muscle Testing) Able to perform R SLR;Able to perform L SLR;Deficits observed during functional mobility   Strength Comments not formally assessed, deficits with right knee after TKA, appears grossly antigravity   Bed Mobility   Comment, (Bed Mobility) supine<>sit SBA   Transfers   Comment, (Transfers) sit<>stand with FWW CGA-SBA   Gait/Stairs (Locomotion)   [place] Level (Gait) contact guard   Assistive Device (Gait) walker, front-wheeled   Distance in Feet 10'   Distance in Feet (Gait) 350'   Pattern (Gait) step-through   Deviations/Abnormal Patterns (Gait) right sided deviations;antalgic;negrita decreased;gait speed decreased;stride length decreased;weight shifting decreased   Maintains Weight-bearing Status (Gait) able to maintain   Balance   Balance Comments normal sitting balance, good standing balance, decreased dynamic balance   Sensory Examination   Sensory Perception patient reports no sensory changes   Clinical Impression   Criteria for Skilled Therapeutic Intervention Yes, treatment indicated   PT Diagnosis (PT) impaired gait   Influenced by the following impairments pain, deficits with ROM, strength, balance   Functional limitations due to impairments decreased ind with bed mobility, transfers, amb, ADL's   Clinical Presentation (PT Evaluation Complexity) Stable/Uncomplicated   Clinical Presentation Rationale PMH and clinical judgement   Clinical Decision Making (Complexity) low complexity   Planned Therapy Interventions (PT) balance training;bed mobility training;cryotherapy;gait training;home exercise program;patient/family education;ROM (range of motion);strengthening;stair training;stretching;transfer training;progressive activity/exercise   Anticipated Equipment  Needs at Discharge (PT)   (pt will provide FWW)   Risk & Benefits of therapy have been explained evaluation/treatment results reviewed;care plan/treatment goals reviewed;risks/benefits reviewed;current/potential barriers reviewed;participants voiced agreement with care plan;participants included;patient;daughter;son   PT Total Evaluation Time   PT Eval, Low Complexity Minutes (95282) 8   Plan of Care Review   Plan of Care Reviewed With patient;daughter;family   Therapy Certification   Start of care date 04/25/23   Certification date from 04/25/23   Certification date to 05/02/23   Medical Diagnosis Right TKA   Physical Therapy Goals   PT Frequency 2x/day   PT Predicted Duration/Target Date for Goal Attainment 04/26/23   PT Goals Bed Mobility;Transfers;Gait;Stairs   PT: Bed Mobility Modified independent;Supine to/from sit;Rolling;Bridging   PT: Transfers Modified independent;Sit to/from stand;Assistive device   PT: Gait Modified independent;Rolling walker;Greater than 200 feet   PT: Stairs Minimal assist;3 stairs;Rail on right   Interventions   Interventions Quick Adds Gait Training;Therapeutic Activity;Therapeutic Procedure   Therapeutic Procedure/Exercise   Ther. Procedure: strength, endurance, ROM, flexibillity Minutes (49852) 4   Symptoms Noted During/After Treatment none   Treatment Detail/Skilled Intervention Educated on post surgical benefits of TE on circulation, functional ROM, and strength. Left pt with TE handout. With pt in supine cued for AP's x 20, on RLE: quad sets x 10, heel slides x 10. Pt tolerated all TE well   Therapeutic Activity   Therapeutic Activities: dynamic activities to improve functional performance Minutes (59953) 6   Symptoms Noted During/After Treatment None   Treatment Detail/Skilled Intervention Greeted pt supine in bed with family members present. Eval completed. Educated on orders for WBAT, ROM to flexion tolerance, and importance of keeping knee straight when resting and  demonstrated how to do so. With HOB elevated supine>sit SBA cues for sequencing pt moving well, with HOB flat sit>supine SBA. Pt able to reposition in bed IND with cueing. Sit<>stand with FWW CGA-SBA, cues for safe walker and hand placement, pt needing reinforcement of cues to keep walker closer when sitting. Increased time for line management and room set up   Gait Training   Gait Training Minutes (95445) 8   Symptoms Noted During/After Treatment (Gait Training) none   Treatment Detail/Skilled Intervention Gait training into hallway with FWW CGA progressing to SBA, pt using slow-mod gait speed with step through pattern, cues for even step length, safe walker placement. Pt moved well overall and had no overt LOB. Encouraged pt to walk with nursing as able   [place] Level (Gait Training) stand-by assist   Physical Assistance Level (Gait Training) supervision   Weight Bearing (Gait Training) weight-bearing as tolerated   Assistive Device (Gait Training) rolling walker   Gait Analysis Deviations decreased negrita;decreased velocity of limb motion;decreased step length;decreased stride length;decreased weight-shifting ability   Impairments (Gait Analysis/Training) strength decreased;balance impaired   PT Discharge Planning   PT Plan review/progress HEP, progress gait distance, bed mobility, safe transfers, steps   PT Discharge Recommendation (DC Rec)   (defer to ortho)   PT Rationale for DC Rec Pt below baseline but moving well and using safe technique. Anticipate pt will progress and by discharge be at/near Mod-I bed mobility, Mod-I transfers with FWW, Mod-I amb with FWW, Navdeep for steps. Pt will have good 24/7 support from family while staying at r and son in law's home for several weeks   PT Brief overview of current status bed mobility SBA, STS with FWW SBA, amb with FWW SBA   Total Session Time   Timed Code Treatment Minutes 18   Total Session Time (sum of timed and untimed services) 26       [facility]  [facility]  OUTPATIENT PHYSICAL THERAPY EVALUATION  PLAN OF TREATMENT FOR OUTPATIENT REHABILITATION  (COMPLETE FOR INITIAL CLAIMS ONLY)  Patient's Last Name, First Name, M.I.  YOB: 1940  Marco Hendricks                        Provider's Name  [facility] Medical Record No.  [id number]                             Onset Date:  04/25/23   Start of Care Date:  04/25/23   Type:     _X_PT   ___OT   ___SLP Medical Diagnosis:  Right TKA              PT Diagnosis:  impaired gait Visits from SOC:  1     See note for plan of treatment, functional goals and certification details    I CERTIFY THE NEED FOR THESE SERVICES FURNISHED UNDER        THIS PLAN OF TREATMENT AND WHILE UNDER MY CARE     (Physician co-signature of this document indicates review and certification of the therapy plan).

## 2023-04-26 NOTE — PLAN OF CARE
Physical Therapy Discharge Summary    Reason for therapy discharge:    Discharged to home.    Progress towards therapy goal(s). See goals on Care Plan in [facility] electronic health record for goal details.  Goals partially met.  Barriers to achieving goals:   discharge from facility.    Therapy recommendation(s):    Continued therapy is recommended.  Rationale/Recommendations:  OP PT per ortho MD/PA protocol to progress knee ROM/strength and independence with functional mobility.

## 2023-04-26 NOTE — PROGRESS NOTES
[facility]    Medicine Progress Note - Hospitalist Service    Date of Admission:  4/25/2023    Assessment & Plan   Marco Hendricks is a 83 year old male admitted on 4/25/2023.  His PMH includes OA, CAD (based on calcium scoring), HTN, HLP, Ascending aorta dilatation, Vertigo, Nephrolithiasis, Migraines, Allergic rhinitis, History of prostate cancer s/p RA seed implantation, History of BCC s/p Mohs who underwent an elective right TKA 4/25/23.  This is a chart check on note    OA with degenerative changes of the right knee s/p right TKA on 4/25/23 w/ Dr. Francisco Crump  - Orthopedic Surgery is managing.   --Analgesic/pain management, DVT prophylaxis, PT/OT consultation per Ortho.    - HGB check on POD #1 is 11.5g/dL, down from 13.8 in 3/2023; likely 2/2 surgical bld loss  - Encourage utilization of incentive spirometer.   -increased risk of delirium given age, anesthesetic exposure so will discharge benadryl    CAD (based on calcium scoring)  HTN  HLP  - Resumed on PTA atorvastatin 20 mg/d.    - Resumed on PTA losartan 100 mg every evening.  Hold parameters in place.    - PRN IV hydralazine available.    - Assess for urinary retention per protocol.    -  Creatinine and BMP 04/26/23 are WNL     Suspected CKD  EMR reviewed and creatinine ranged between 1.05-1.10 with GFR between 60-70's.    -  Creatinine and BMP 04/26/23 are WNL       Acute bld loss anemia likely 2/2 surgical loss  - HGB check on POD #1 is 11.5g/dL, down from 13.8 in 3/2023  -OP f/u prn    Ascending aorta dilatation  - Continue with outpatient surveillance.      Vertigo  Noted on pre-op H&P but not on any medications.  No interventions.      Nephrolithiasis  Noted on pre-op H&P but not on any medications.  No interventions.      Migraines  Noted on pre-op H&P but not on any medications.  No interventions.      Allergic rhinitis  - Hold PTA Allegra 180 mg/d and resume at discharge.      History of prostate cancer s/p RA seed  "implantation  No interventions at this time.      History of BCC s/p Mohs  No interventions at this time.           Diet: Advance Diet as Tolerated: Regular Diet Adult  Discharge Instruction - Regular Diet Adult    DVT Prophylaxis: Defer to primary service  Long Catheter: Not present  Lines: None     Cardiac Monitoring: None  Code Status: Full Code      Clinically Significant Risk Factors Present on Admission                  # Hypertension: home medication list includes antihypertensive(s)      # Overweight: Estimated body mass index is 27.09 kg/m  as calculated from the following:    Height as of this encounter: 1.778 m (5' 10\").    Weight as of this encounter: 85.6 kg (188 lb 12.8 oz).        -increases all cause morbidity and mortality  -OP follow up re: weight loss & lifestyle changes     Disposition Plan      Expected Discharge Date: 04/26/2023                The Hospitalist Service will sign-off. Please call us with questions, concerns or any new medical issues that arise during patient's hospitalization.    HEATHER [facility]  Hospitalist Service  [facility]  Securely message with [employer] (more info)  Text page via [employer] Paging/Directory   ______________________________________________________________________    Interval History   No acute events overnight, was able to ambulate in the hdez twice.    Physical Exam   Vital Signs: Temp: 98.2  F (36.8  C) Temp src: Oral BP: 126/82 Pulse: 97   Resp: 18 SpO2: 92 % O2 Device: None (Room air)    Weight: 188 lbs 12.8 oz    I did not examine pt      Data     I have personally reviewed the following data over the past 24 hrs:    N/A  \   11.5 (L)   / N/A     140 106 15.3 /  121 (H)   4.5 25 1.05 \       "

## 2023-04-26 NOTE — PLAN OF CARE
Occupational Therapy: Orders received. Chart reviewed and discussed with care team.? Occupational Therapy not indicated. Spoke with PT, pt mobilizing near baseline level. Pt with supportive family with plans for I/ADL assist as needed. No further acute OT needs.? Defer discharge recommendations to Care Team.? Will complete orders.

## (undated) DEVICE — SU STRATAFIX MONOCRYL 2-0 SPIRAL SH 20CM SXMP1B409

## (undated) DEVICE — GLOVE BIOGEL PI ORTHOPRO SZ 8.5 47685

## (undated) DEVICE — NDL 18GA 1.5" 305196

## (undated) DEVICE — SUCTION IRR SYSTEM W/O TIP INTERPULSE HANDPIECE 0210-100-000

## (undated) DEVICE — GLOVE BIOGEL PI MICRO INDICATOR UNDERGLOVE SZ 8.0 48980

## (undated) DEVICE — SYR 30ML LL W/O NDL 302832

## (undated) DEVICE — DRSG XEROFORM 1X8"

## (undated) DEVICE — NDL SPINAL 18GA 3.5" 405184

## (undated) DEVICE — BLADE SAW SAGITTAL STRK 18X90X1.27MM HD SYS 6 6118-127-090

## (undated) DEVICE — SYR 10ML SLIP TIP W/O NDL 303134

## (undated) DEVICE — CLOSURE SYS SKIN PREMIERPRO EXOFIN FUSION 4X22CM STRL 3472

## (undated) DEVICE — PACK TOTAL HIP W/POUCH SOP15HPFSM

## (undated) DEVICE — DRAPE IOBAN INCISE 23X17" 6650EZ

## (undated) DEVICE — SU FIBERWIRE 5 CCS-1 BLUE  AR-7211

## (undated) DEVICE — SOLUTION WOUND CLEANSING 3/4OZ 10% PVP EA-L3011FB-50

## (undated) DEVICE — SU PDO 1 STRATAFIX 36X36CM CTX TAPERPOINT SXPD2B405

## (undated) DEVICE — Device

## (undated) DEVICE — SU STRATAFIX PDS PLUS 0 CT-1 18" SXPP1A401

## (undated) DEVICE — WRAP EZY KNEE

## (undated) DEVICE — ESU CLEANER TIP 31142717

## (undated) DEVICE — BONE CLEANING TIP INTERPULSE  0210-010-000

## (undated) DEVICE — HOOD SURG T7PLUS PEEL AWAY FACE SHIELD STRL LF 0416-801-100

## (undated) DEVICE — SOL WATER IRRIG 1000ML BOTTLE 2F7114

## (undated) DEVICE — ESU GROUND PAD UNIVERSAL W/O CORD

## (undated) DEVICE — SU STRATAFIX PDS PLUS 0 CT 45CM SXPP1A406

## (undated) DEVICE — BLADE SAW SAGITTAL STRK 21X90X1.27MM HD SYS 6 6221-127-090

## (undated) DEVICE — SOL NACL 0.9% IRRIG 3000ML BAG 2B7477

## (undated) DEVICE — DRAPE STERI U 1015

## (undated) DEVICE — CAST PADDING 6" UNSTERILE 9046

## (undated) DEVICE — PAD FOAM MCGUIRE KIT 0814-0150

## (undated) DEVICE — SOL NACL 0.9% IRRIG 1000ML BOTTLE 2F7124

## (undated) DEVICE — SOL NACL 0.9% INJ 1000ML BAG 2B1324X

## (undated) DEVICE — MANIFOLD NEPTUNE 4 PORT 700-20

## (undated) DEVICE — BLADE CLIPPER 4412A

## (undated) DEVICE — DEVICE RETRIEVER HEWSON 71111579

## (undated) DEVICE — SU ETHICON STRATAFIX SPR PS-2 3-0 30X30CM SXMP2B408

## (undated) DEVICE — DRAPE ARTHROSCOPY 120X92" 9414

## (undated) DEVICE — PACK TOTAL KNEE SOP15TKFSD

## (undated) DEVICE — SU ETHIBOND 1 CT-1 30" X425H

## (undated) DEVICE — DRSG ADAPTIC 3X8" 6113

## (undated) DEVICE — LINEN TOWEL PACK X5 5464

## (undated) DEVICE — DRSG STERI STRIP 1/2X4" R1547

## (undated) DEVICE — STRATAFIX 3-0

## (undated) DEVICE — IMM PILLOW ABDUCT HIP MED 31143061

## (undated) DEVICE — CAST PADDING 6" STERILE 9046S

## (undated) DEVICE — CAST PADDING 4" UNSTERILE 9044

## (undated) DEVICE — BONE CEMENT MIXEVAC III HI VAC KIT  0206-015-000

## (undated) RX ORDER — CEFAZOLIN SODIUM/WATER 2 G/20 ML
SYRINGE (ML) INTRAVENOUS
Status: DISPENSED
Start: 2022-12-20

## (undated) RX ORDER — TRANEXAMIC ACID 650 MG/1
TABLET ORAL
Status: DISPENSED
Start: 2023-04-25

## (undated) RX ORDER — ONDANSETRON 2 MG/ML
INJECTION INTRAMUSCULAR; INTRAVENOUS
Status: DISPENSED
Start: 2022-12-20

## (undated) RX ORDER — CEFAZOLIN SODIUM/WATER 2 G/20 ML
SYRINGE (ML) INTRAVENOUS
Status: DISPENSED
Start: 2023-04-25

## (undated) RX ORDER — VANCOMYCIN HYDROCHLORIDE 1 G/20ML
INJECTION, POWDER, LYOPHILIZED, FOR SOLUTION INTRAVENOUS
Status: DISPENSED
Start: 2022-12-20

## (undated) RX ORDER — CELECOXIB 200 MG/1
CAPSULE ORAL
Status: DISPENSED
Start: 2023-04-25

## (undated) RX ORDER — CELECOXIB 200 MG/1
CAPSULE ORAL
Status: DISPENSED
Start: 2022-12-20

## (undated) RX ORDER — FENTANYL CITRATE 50 UG/ML
INJECTION, SOLUTION INTRAMUSCULAR; INTRAVENOUS
Status: DISPENSED
Start: 2023-04-25

## (undated) RX ORDER — DEXMEDETOMIDINE HYDROCHLORIDE 4 UG/ML
INJECTION, SOLUTION INTRAVENOUS
Status: DISPENSED
Start: 2023-04-25

## (undated) RX ORDER — DEXAMETHASONE SODIUM PHOSPHATE 4 MG/ML
INJECTION, SOLUTION INTRA-ARTICULAR; INTRALESIONAL; INTRAMUSCULAR; INTRAVENOUS; SOFT TISSUE
Status: DISPENSED
Start: 2022-12-20

## (undated) RX ORDER — PROPOFOL 10 MG/ML
INJECTION, EMULSION INTRAVENOUS
Status: DISPENSED
Start: 2022-12-20

## (undated) RX ORDER — ONDANSETRON 2 MG/ML
INJECTION INTRAMUSCULAR; INTRAVENOUS
Status: DISPENSED
Start: 2023-04-25

## (undated) RX ORDER — ACETAMINOPHEN 325 MG/1
TABLET ORAL
Status: DISPENSED
Start: 2023-04-25

## (undated) RX ORDER — ACETAMINOPHEN 325 MG/1
TABLET ORAL
Status: DISPENSED
Start: 2022-12-20

## (undated) RX ORDER — TRANEXAMIC ACID 650 MG/1
TABLET ORAL
Status: DISPENSED
Start: 2022-12-20